# Patient Record
Sex: MALE | Race: WHITE | NOT HISPANIC OR LATINO | Employment: FULL TIME | URBAN - NONMETROPOLITAN AREA
[De-identification: names, ages, dates, MRNs, and addresses within clinical notes are randomized per-mention and may not be internally consistent; named-entity substitution may affect disease eponyms.]

---

## 2018-02-16 ENCOUNTER — TRANSCRIBE ORDERS (OUTPATIENT)
Dept: ADMINISTRATIVE | Facility: HOSPITAL | Age: 63
End: 2018-02-16

## 2018-02-16 DIAGNOSIS — G56.03 CARPAL TUNNEL SYNDROME, BILATERAL UPPER LIMBS: Primary | ICD-10-CM

## 2018-03-27 ENCOUNTER — HOSPITAL ENCOUNTER (OUTPATIENT)
Dept: NEUROLOGY | Facility: HOSPITAL | Age: 63
Discharge: HOME | End: 2018-03-27
Admitting: FAMILY MEDICINE

## 2018-03-27 DIAGNOSIS — G56.03 CARPAL TUNNEL SYNDROME, BILATERAL UPPER LIMBS: ICD-10-CM

## 2018-03-27 PROCEDURE — 95886 MUSC TEST DONE W/N TEST COMP: CPT

## 2018-03-27 PROCEDURE — 95911 NRV CNDJ TEST 9-10 STUDIES: CPT | Performed by: PSYCHIATRY & NEUROLOGY

## 2018-03-27 PROCEDURE — 95886 MUSC TEST DONE W/N TEST COMP: CPT | Performed by: PSYCHIATRY & NEUROLOGY

## 2018-03-27 PROCEDURE — 95911 NRV CNDJ TEST 9-10 STUDIES: CPT

## 2018-03-28 ENCOUNTER — APPOINTMENT (OUTPATIENT)
Dept: NEUROLOGY | Facility: HOSPITAL | Age: 63
End: 2018-03-28

## 2018-05-24 ENCOUNTER — HOSPITAL ENCOUNTER (OUTPATIENT)
Dept: GENERAL RADIOLOGY | Facility: HOSPITAL | Age: 63
Discharge: HOME | End: 2018-05-24
Admitting: NURSE PRACTITIONER

## 2018-05-24 ENCOUNTER — OFFICE VISIT (OUTPATIENT)
Dept: NEUROSURGERY | Facility: CLINIC | Age: 63
End: 2018-05-24

## 2018-05-24 VITALS
WEIGHT: 208 LBS | BODY MASS INDEX: 32.65 KG/M2 | DIASTOLIC BLOOD PRESSURE: 74 MMHG | HEIGHT: 67 IN | SYSTOLIC BLOOD PRESSURE: 122 MMHG

## 2018-05-24 DIAGNOSIS — Z78.9 NONSMOKER: ICD-10-CM

## 2018-05-24 DIAGNOSIS — M54.2 NECK PAIN: ICD-10-CM

## 2018-05-24 DIAGNOSIS — R20.0 NUMBNESS IN BOTH HANDS: ICD-10-CM

## 2018-05-24 DIAGNOSIS — G56.03 BILATERAL CARPAL TUNNEL SYNDROME: ICD-10-CM

## 2018-05-24 DIAGNOSIS — M54.2 NECK PAIN: Primary | ICD-10-CM

## 2018-05-24 DIAGNOSIS — R29.898 HAND WEAKNESS: ICD-10-CM

## 2018-05-24 PROCEDURE — 72040 X-RAY EXAM NECK SPINE 2-3 VW: CPT

## 2018-05-24 PROCEDURE — 99204 OFFICE O/P NEW MOD 45 MIN: CPT | Performed by: NURSE PRACTITIONER

## 2018-05-24 RX ORDER — MULTIPLE VITAMINS W/ MINERALS TAB 9MG-400MCG
1 TAB ORAL DAILY
COMMUNITY

## 2018-05-24 NOTE — PROGRESS NOTES
"Neurosurgery Initial Patient Visit    Patient: Adam Ferris  : 1955    Primary Care Provider: DINA Luna    Requesting Provider: Praveen Mojica MD      History    Chief Complaint:   Chief Complaint   Patient presents with   • Neck Pain     3 months ago he started having neck pain and numbness in both hands. Pt states that he has had issues with his neck for years.  Pt states that he hasn't had any PT, PM or chicropractor.   • Numbness       History of Present Illness: He is a 62-year-old  male who presents with chief complaint of \"numbness in hands, neck pain.\"  He is referred by Dr. Praveen Mojica through the [facility], and we have been asked see the patient in consultation for further evaluation.    He explains that he has had problems with his neck almost all of his life.  He does not relate any particular injuries or events.  The last 3 months, he has developed the worst neck pain that he has ever had and describes that it is now constant.  He has also had a completely new development numbness, tingling, and pain in both hands.  Asked, he has never experienced any type of radicular feature into either arm.  He had been waking up with both of his hands completely numb.  He underwent testing and was prescribed wrist splints about 2 months ago.  His numbness may be a bit less severe, but he never regains full sensation into either hand.  He has had cervical x-ray along with MRI and EMG nerve conduction testing of both upper extremities for review.  He has not yet had chiropractic treatment or therapy.  He has never been in a pain management setting.    Allergies:   Patient has no known allergies.    Past Medical History:    Past Medical History:   Diagnosis Date   • Diabetes mellitus    • Disease of thyroid gland    • Hyperlipidemia        Past Surgical History:   Past Surgical History:   Procedure Laterality Date   • MOLE REMOVAL     • VASECTOMY         Medications:    Current Outpatient " Prescriptions on File Prior to Visit   Medication Sig Dispense Refill   • aspirin  MG tablet Take 325 mg by mouth Daily.     • glipiZIDE (GLUCOTROL) 5 MG tablet Take 5 mg by mouth 2 (Two) Times a Day Before Meals.     • insulin aspart prot-insulin aspart (novoLOG 70/30) (70-30) 100 UNIT/ML injection Inject  under the skin 2 (Two) Times a Day With Meals. 60 units     • LEVOTHYROXINE SODIUM PO Take  by mouth Daily.     • lisinopril (PRINIVIL,ZESTRIL) 10 MG tablet Take 10 mg by mouth Daily.     • metFORMIN (GLUCOPHAGE) 1000 MG tablet Take 1,000 mg by mouth 2 (Two) Times a Day With Meals.     • pravastatin (PRAVACHOL) 10 MG tablet Take 10 mg by mouth Daily.     • [DISCONTINUED] erythromycin (ROMYCIN) 5 MG/GM ophthalmic ointment Administer  to the right eye Every 4 (Four) Hours While Awake. 3.5 g 0     No current facility-administered medications on file prior to visit.         Social History:   reports that he has never smoked. He has never used smokeless tobacco. He reports that he drinks alcohol. He reports that he does not use drugs.  He is  and lives here in [place].  He is employed as an  and .  With regards to alcohol, he consumes this rarely.    Family History:    Family History   Problem Relation Age of Onset   • Diabetes Mother    • Hypertension Mother    • Diabetes Father    • Diabetes Sister    • Diabetes Brother    • Hypertension Brother    • Diabetes Brother    • Hypertension Brother    • Diabetes Brother    • Hypertension Brother    • Diabetes Sister        Review of Systems:  Review of Systems   Constitutional: Negative for chills, fever and unexpected weight change.   HENT: Positive for hearing loss and tinnitus. Negative for congestion, rhinorrhea and sore throat.    Eyes: Negative for photophobia and visual disturbance.   Respiratory: Positive for apnea. Negative for cough, shortness of breath and wheezing.    Cardiovascular: Negative for chest pain  and palpitations.   Gastrointestinal: Negative for constipation, diarrhea, nausea and vomiting.   Genitourinary: Positive for frequency. Negative for difficulty urinating.   Musculoskeletal: Positive for neck pain and neck stiffness. Negative for arthralgias, back pain and gait problem.   Skin: Negative for rash.   Allergic/Immunologic: Negative for environmental allergies.   Neurological: Positive for weakness and numbness. Negative for seizures and headaches.   Hematological: Does not bruise/bleed easily.   Psychiatric/Behavioral: Negative for confusion. The patient is not nervous/anxious.    All other systems reviewed and are negative.        Neurological Physical Examination    Physical Exam   Constitutional: He is oriented to person, place, and time. He appears well-developed and well-nourished. No distress.   He is very pleasant and cooperative, in no acute distress.   HENT:   Head: Normocephalic and atraumatic.   Eyes: No scleral icterus.   Neck: Neck supple. No tracheal deviation and normal range of motion present.   Cardiovascular: Normal rate, regular rhythm and normal heart sounds.    No murmur heard.  Pulmonary/Chest: Effort normal and breath sounds normal. No respiratory distress. He has no wheezes.   Respirations even and unlabored with no distress.   Musculoskeletal:   Cervical range of motion is done pretty well.  It elicits a sensation of crepitus.  There is no acute focal weakness to either upper extremity, except a questionable subtle weakness of right .  Thumb abduction is done well bilaterally.  There is slight sponginess of the right thenar muscle and no definite Tinel's at either wrist or elbow.  Mildly positive Phalen's bilaterally.  Deep tendon reflexes diminished bilaterally.  Amy's negative.   Neurological: He is alert and oriented to person, place, and time. He displays normal reflexes. No cranial nerve deficit.   Optic discs not visualized.   Skin: Skin is warm and dry. No rash  noted.   Psychiatric: He has a normal mood and affect. His speech is normal and behavior is normal. Cognition and memory are normal.   Vitals reviewed.         Medical Decision Making    Management Options:  In the office we have discussed his care.  I have reviewed his imaging on disc.  The cervical x-ray shows significant degenerative change and arthritic spurring throughout.  There is resulting narrowing at multiple levels and anterolisthesis at C4 5.  The cervical MRI also indicates multi level degenerative changes.  These are most noted at C3 4, where there is disc degeneration and bulging with resulting foraminal narrowing that is greater on the left.  There is also a central disc bulge at C5 6, and disc degeneration and bulging at C6 7 with relative foraminal narrowing that is also greater on the left.  Review of EMG and nerve conduction testing indicates chronic nerve root irritation at C5 through 7 as well as bilateral carpal tunnel, likely a bit worse on the right.    We have talked about all of his test results at length, as well as continued plan of care.  He is experiencing exacerbation of chronic neck pain over the last 3 months, but he does not have true, definite radicular features affecting either upper extremity.  The only symptoms that he reports are clearly confined to the hands and fingers and would seem to be highly consistent with known bilateral carpal tunnel.  A true cervical radiculopathy seems less likely at this time.    Have agreed on a current course of physical therapy over the next 4 weeks.  He will continue with use of his wrist splints.  We have discussed medications and he really would like to avoid them if at all possible.  He is more comfortable taking something over-the-counter, likely Tylenol on occasion.  We will further evaluate the anterolisthesis in the cervical spine with an x-ray with flexion and extension views.  We have also talked about the possibility of surgical  treatment for carpal tunnel.  We have agreed on seeing him back in 4-6 weeks with Dr. Guy to determine his response to therapy.  If he is not improving or feels that he is worsening, that he may be ready for a surgical discussion, which again, I think would be to address a right carpal tunnel release.    Imaging is here in the office on file for further review as needed.    Information regarding BMI has been given in an effort to help support overall health and wellness.    Adam was seen today for neck pain and numbness.    Diagnoses and all orders for this visit:    Neck pain  -     Ambulatory Referral to Physical Therapy Evaluate and treat (3x a week for 4 weeks), Neuro, Ortho  -     XR Spine Cervical Flex & Ext Only; Future    Numbness in both hands    Bilateral carpal tunnel syndrome    Hand weakness    BMI 32.0-32.9,adult    Nonsmoker        Thank you, Praveen Mojica MD for this Consultation and the opportunity to participate in the care of this pleasant patient.

## 2018-05-24 NOTE — PATIENT INSTRUCTIONS

## 2018-06-15 ENCOUNTER — HOSPITAL ENCOUNTER (OUTPATIENT)
Dept: PHYSICAL THERAPY | Facility: HOSPITAL | Age: 63
Setting detail: THERAPIES SERIES
Discharge: HOME | End: 2018-06-15

## 2018-06-15 DIAGNOSIS — R20.0 NUMBNESS IN BOTH HANDS: ICD-10-CM

## 2018-06-15 DIAGNOSIS — M54.2 NECK PAIN: Primary | ICD-10-CM

## 2018-06-15 DIAGNOSIS — G56.03 BILATERAL CARPAL TUNNEL SYNDROME: ICD-10-CM

## 2018-06-15 PROCEDURE — 97161 PT EVAL LOW COMPLEX 20 MIN: CPT | Performed by: PHYSICAL THERAPIST

## 2018-06-15 NOTE — THERAPY EVALUATION
Outpatient Physical Therapy Ortho Initial Evaluation  [facility]     Patient Name: Adam Ferris  : 1955  MRN: [id number]  Today's Date: 6/15/2018      Visit Date: 06/15/2018    Patient Active Problem List   Diagnosis   • Neck pain   • Numbness in both hands   • BMI 32.0-32.9,adult   • Nonsmoker   • Hand weakness   • Bilateral carpal tunnel syndrome        Past Medical History:   Diagnosis Date   • Diabetes mellitus    • Disease of thyroid gland    • Hyperlipidemia         Past Surgical History:   Procedure Laterality Date   • MOLE REMOVAL     • VASECTOMY         Visit Dx:     ICD-10-CM ICD-9-CM   1. Neck pain M54.2 723.1   2. Numbness in both hands R20.0 782.0   3. Bilateral carpal tunnel syndrome G56.03 354.0             Patient History     Row Name 06/15/18 0745             History    Chief Complaint Pain;Joint stiffness;Numbness  -TB      Type of Pain Neck pain  -TB      Date Current Problem(s) Began 02/15/18  -TB      Brief Description of Current Complaint He has a lifelong h/o neck pain. Over the last 4 months ago, he started having numbness in his hands which affects his work. He says his neck pain has also worsened during this time. He says he was told he may need surgery but they want to try other options first.  -TB      Patient/Caregiver Goals Relieve pain;Return to prior level of function;Improve mobility  -TB      Patient's Rating of General Health Good  -TB      Hand Dominance right-handed  -TB      Occupation/sports/leisure activities   -TB      What clinical tests have you had for this problem? X-ray  -TB      Results of Clinical Tests severe spondylosis in neck  -TB         Pain     Pain Location Neck  -TB      Pain at Present 2  -TB      Pain at Best 1  -TB      Pain at Worst 3  -TB      Pain Frequency Constant/continuous  -TB      Pain Description Aching  -TB      Pain Comments c/o hand burning every night; then burning and numbness is his whole hand like a glove  -TB       Difficulties at work? bothers him when he types  -TB         Fall Risk Assessment    Any falls in the past year: No  -TB         Services    Prior Rehab/Home Health Experiences No  -TB      Are you currently receiving Home Health services No  -TB         Daily Activities    Primary Language English  -TB      How does patient learn best? Listening;Reading;Demonstration  -TB      Teaching needs identified Home Exercise Program;Management of Condition  -TB      Patient is concerned about/has problems with Performing home management (household chores, shopping, care of dependents);Performing job responsibilities/community activities (work, school,;Difficulty with self care (i.e. bathing, dressing, toileting:;Grasping objects lifting  -TB      Does patient have problems with the following? None  -TB      Barriers to learning None  -TB      Pt Participated in POC and Goals Yes  -TB         Safety    Are you being hurt, hit, or frightened by anyone at home or in your life? No  -TB      Are you being neglected by a caregiver No  -TB        User Key  (r) = Recorded By, (t) = Taken By, (c) = Cosigned By    Initials Name Provider Type    TB Slick Wick, PT Physical Therapist                PT Ortho     Row Name 06/15/18 0800       Pathomechanics    Spine Pathomechanics Hinges into extension in lower cervical;Limited upper thoracic motion with cervical ROM;Limited opposite AA rotation with cervical sidebending;Cervical protrusion/OA hyperextension with cervical extension  -TB    Row Name 06/15/18 0745       Posture/Observations    Posture/Observations Comments forward   -TB       Quarter Clearing    Quarter Clearing Upper Quarter Clearing  -TB       DTR- Upper Quarter Clearing    Biceps (C5/6) Bilateral:;2- Normal response  -TB    Brachioradialis (C6) Bilateral:;2- Normal response  -TB    Triceps (C7) Bilateral:;2- Normal response  -TB       Sensory Screen for Light Touch- Upper Quarter Clearing    C4 (posterior shoulder)  Bilateral:;Intact  -TB    C5 (lateral upper arm) Bilateral:;Intact  -TB    C6 (tip of thumb) Bilateral:;Intact  -TB    C7 (tip of 3rd finger) Bilateral:;Intact  -TB    C8 (tip of 5th finger) Bilateral:;Intact  -TB    T1 (medial lower arm) Bilateral:;Intact  -TB       Myotomal Screen- Upper Quarter Clearing    Shoulder flexion (C5) Bilateral:;WNL  -TB    Elbow flexion/wrist extension (C6) Bilateral:;WNL  -TB    Elbow extension/wrist flexion (C7) Bilateral:;WNL  -TB    Finger flexion/ (C8) Bilateral:;WNL  -TB    Finger abduction (T1) Bilateral:;WNL  -TB     Bilateral:;WNL  -TB       Cervical/Shoulder ROM Screen    Cervical flexion Normal  -TB    Cervical extension Impaired   25%  -TB    Cervical lateral flexion Impaired   50%  -TB    Cervical rotation Impaired   75%  -TB       Special Tests/Palpation    Special Tests/Palpation Cervical/Thoracic  -TB       Cervical Accessory Motions    Cervical Accessory Motions Tested? Yes  -TB    AA Rotation Hypomobile  -TB    Sideglide- C3 Right:;Left:;Hypomobile   left worse  -TB    Sideglide- C4 Right:;Left:;Hypomobile   left worse  -TB    Sideglide- C5 Right:;Left:;Hypomobile   left worse  -TB    PA glide- C6 Center:;Hypomobile  -TB    PA glide- C7 Center:;Hypomobile  -TB       Thoracic Accessory Motions    Thoracic Accessory Motions Tested? Yes  -TB    Pa glide- Upper thoracic Center:;Hypomobile  -TB    Pa glide- Middle thoracic Center:;Hypomobile  -TB       Cervical/Thoracic Special Tests    Spurlings (Foraminal Compression) Bilateral:;Negative  -TB    Cervical Compression (Forarminal Compression vs. Facet Pain) Bilateral:;Negative  -TB    Cervical Distraction (Foraminal Compression vs. Facet Pain) Bilateral:;Negative  -TB    Lauren's Test (TOS) Bilateral:;Positive  -TB       Wrist/Hand Special Tests    Phalen’s test (carpal tunnel syndrome) Bilateral:;Positive   slight change in sensation in finger  -TB    Reverse Phalen’s (carpal tunnel syndrome) Bilateral:;Negative  -TB     Tinel’s sign (median nerve irritation) Bilateral:;Negative  -TB    Scratch collapse at Guyon’s canal (ulnar nerve entrapment) Bilateral:;Positive   at neck  -TB      User Key  (r) = Recorded By, (t) = Taken By, (c) = Cosigned By    Initials Name Provider Type    MARGUERITE Wick, PT Physical Therapist           Hand Therapy (last 24 hours)      Hand Eval     Row Name 06/15/18 0800             Hand  Strength     Strength Affected Side Bilateral  -TB          Strength Right    # Reps 3  -TB      Right Rung 3  -TB      Right  Test 1 94  -TB      Right  Test 2 106  -TB      Right  Test 3 105  -TB       Strength Average Right 101.67  -TB          Strength Left    # Reps 3  -TB      Left Rung 3  -TB      Left  Test 1 110  -TB      Left  Test 2 114  -TB      Left  Test 3 111  -TB       Strength Average Left 111.67  -TB        User Key  (r) = Recorded By, (t) = Taken By, (c) = Cosigned By    Initials Name Provider Type    MARGUERITE Wick, PT Physical Therapist                    Therapy Education  Education Details: wrist flexors stretch, doorway pect stretch, cx retraction/ext  Given: HEP, Posture/body mechanics, Symptoms/condition management  Program: New  How Provided: Verbal, Demonstration, Written  Provided to: Patient  Level of Understanding: Teach back education performed, Verbalized, Demonstrated           PT OP Goals     Row Name 06/15/18 0745          Long Term Goals    LTG Date to Achieve 07/27/18  -TB     LTG 1 No numbness in his hands for a week  -TB     LTG 1 Progress New  -TB     LTG 2 Improve neck extension to 50%  -TB     LTG 2 Progress New  -TB     LTG 3 Improve neck pain to <25% of the day avg of 1-2/10 when it occurs  -TB     LTG 3 Progress New  -TB     LTG 4 Able to type and work without exacerbation of hand numbness  -TB     LTG 4 Progress New  -TB     LTG 5 Ind with HEP for flexibility and ergonomics.   -TB        Time Calculation    PT Goal  Re-Cert Due Date 07/15/18  -TB       User Key  (r) = Recorded By, (t) = Taken By, (c) = Cosigned By    Initials Name Provider Type    TB Slick Wick, PT Physical Therapist                PT Assessment/Plan     Row Name 06/15/18 0745          PT Assessment    Functional Limitations Limitation in home management;Performance in work activities;Limitations in functional capacity and performance;Performance in leisure activities;Performance in self-care ADL  -TB     Impairments Posture;Range of motion;Pain;Sensation;Poor body mechanics  -TB     Assessment Comments He appears to have a couple of issues going on. What reproduced his hand numbness was a carpal tunnel test, but he was also positive with a Scratch-collapse test at his neck. His neck is quite stiff with a forward head posture. He hinges into extension in his lower neck. He said that his whole hand like a glove was going numb which would mean perhaps something other than his CTS was contributing to the numbness. I think he can do well with PT to help to decrease his neck pain and have a strategy to help his hand symptoms.   -TB     Please refer to paper survey for additional self-reported information Yes  -TB     Rehab Potential Good  -TB     Patient/caregiver participated in establishment of treatment plan and goals Yes  -TB     Patient would benefit from skilled therapy intervention Yes  -TB        PT Plan    PT Frequency 2x/week  -TB     Predicted Duration of Therapy Intervention (Therapy Eval) 6 weeks  -TB     Planned CPT's? PT EVAL LOW COMPLEXITY: 35486;PT THER PROC EA 15 MIN: 81337;PT MANUAL THERAPY EA 15 MIN: 97025;PT ELECTRICAL STIM UNATTEND: ;PT ELECTRICAL STIM ATTD EA 15 MIN: 59630;PT TRACTION CERVICAL: 13566  -TB     PT Plan Comments We will focus early on mobilizations through his neck and thoracic as well as his carpals to improve nerve mobility and decompress wherever we can. We will also work on flexibility through his pect and progress  with postural stability.   -TB       User Key  (r) = Recorded By, (t) = Taken By, (c) = Cosigned By    Initials Name Provider Type    TB Slick Wick, PT Physical Therapist                                        Time Calculation:     Therapy Suggested Charges     Code   Minutes Charges    None             Start Time: 0745  Stop Time: 0850  Time Calculation (min): 65 min     Therapy Charges for Today     Code Description Service Date Service Provider Modifiers Qty    [id number]  PT EVAL LOW COMPLEXITY 4 6/15/2018 Slick Wick, PT GP 1                    Slick Wick, PT  6/15/2018

## 2018-06-18 ENCOUNTER — OFFICE VISIT (OUTPATIENT)
Dept: GASTROENTEROLOGY | Facility: CLINIC | Age: 63
End: 2018-06-18

## 2018-06-18 VITALS
OXYGEN SATURATION: 100 % | SYSTOLIC BLOOD PRESSURE: 130 MMHG | DIASTOLIC BLOOD PRESSURE: 76 MMHG | BODY MASS INDEX: 32.65 KG/M2 | HEIGHT: 67 IN | TEMPERATURE: 98.7 F | HEART RATE: 80 BPM | WEIGHT: 208 LBS

## 2018-06-18 DIAGNOSIS — Z12.11 ENCOUNTER FOR SCREENING FOR MALIGNANT NEOPLASM OF COLON: Primary | ICD-10-CM

## 2018-06-18 PROCEDURE — S0260 H&P FOR SURGERY: HCPCS | Performed by: NURSE PRACTITIONER

## 2018-06-18 NOTE — PROGRESS NOTES
Chief Complaint   Patient presents with   • Colonoscopy     had colon about 10 years ago he is not having any problems now not sure who did last colon     Subjective   HPI    Adam Ferris is a 62 y.o. male who presents to office for preventative maintenance.  There is not  a personal history of colon polyps.  There is not a history of colon cancer.  He does not have complaints of nausea/vomiting, change in bowels, weight loss, no BRBPR, no melena.  There is not a family history of colon cancer.  There is not a family history of colon polyps.  Pt last colonoscopy-apx 10 yr ago (unsure of physician) .  Bowels do not move on regular basis.      Past Medical History:   Diagnosis Date   • Diabetes mellitus    • Disease of thyroid gland    • Hyperlipidemia      Past Surgical History:   Procedure Laterality Date   • MOLE REMOVAL     • VASECTOMY       Outpatient Prescriptions Marked as Taking for the 6/18/18 encounter (Office Visit) with DINA Johnson   Medication Sig Dispense Refill   • ACAI LUCAS PO Take  by mouth.     • Ascorbic Acid (VITAMIN C PO) Take 1,000 mg by mouth Daily.     • aspirin  MG tablet Take 325 mg by mouth Daily.     • Cholecalciferol (VITAMIN D3 PO) Take  by mouth.     • glipiZIDE (GLUCOTROL) 5 MG tablet Take 5 mg by mouth 2 (Two) Times a Day Before Meals.     • insulin aspart prot-insulin aspart (novoLOG 70/30) (70-30) 100 UNIT/ML injection Inject  under the skin 2 (Two) Times a Day With Meals. 60 units     • LEVOTHYROXINE SODIUM PO Take  by mouth Daily.     • lisinopril (PRINIVIL,ZESTRIL) 10 MG tablet Take 10 mg by mouth Daily.     • metFORMIN (GLUCOPHAGE) 1000 MG tablet Take 1,000 mg by mouth 2 (Two) Times a Day With Meals.     • Multiple Vitamins-Minerals (MULTIVITAMIN WITH MINERALS) tablet tablet Take 1 tablet by mouth Daily.     • Omega-3 Fatty Acids (FISH OIL) 1200 MG capsule delayed-release Take  by mouth.     • pravastatin (PRAVACHOL) 10 MG tablet Take 10 mg by mouth Daily.        No Known Allergies  Social History     Social History   • Marital status:      Spouse name: N/A   • Number of children: N/A   • Years of education: N/A     Occupational History   • Not on file.     Social History Main Topics   • Smoking status: Never Smoker   • Smokeless tobacco: Former User   • Alcohol use Yes      Comment: occ   • Drug use: No   • Sexual activity: Defer     Other Topics Concern   • Not on file     Social History Narrative   • No narrative on file     Family History   Problem Relation Age of Onset   • Diabetes Mother    • Hypertension Mother    • Diabetes Father    • Colon polyps Father    • Diabetes Sister    • Diabetes Brother    • Hypertension Brother    • Colon polyps Brother    • Diabetes Brother    • Hypertension Brother    • Diabetes Brother    • Hypertension Brother    • Diabetes Sister    • Throat cancer Maternal Grandfather      Review of Systems   Constitutional: Negative for fatigue, fever and unexpected weight change.   HENT: Negative for hearing loss, sore throat and voice change.    Eyes: Negative for visual disturbance.   Respiratory: Negative for cough, shortness of breath and wheezing.    Cardiovascular: Negative for chest pain and palpitations.   Gastrointestinal: Negative for abdominal pain, blood in stool and vomiting.   Endocrine: Negative for polydipsia and polyuria.   Genitourinary: Negative for difficulty urinating, dysuria, hematuria and urgency.   Musculoskeletal: Negative for joint swelling and myalgias.   Skin: Negative for color change, rash and wound.   Neurological: Negative for dizziness, tremors, seizures and syncope.   Hematological: Does not bruise/bleed easily.   Psychiatric/Behavioral: Negative for agitation and confusion. The patient is not nervous/anxious.      Objective   Vitals:    06/18/18 1400   BP: 130/76   Pulse: 80   Temp: 98.7 °F (37.1 °C)   SpO2: 100%     Physical Exam   Constitutional: He is oriented to person, place, and time. He appears  well-developed and well-nourished. He is cooperative.   HENT:   Head: Normocephalic and atraumatic.   Eyes: Conjunctivae are normal. Pupils are equal, round, and reactive to light. No scleral icterus.   Neck: Normal range of motion. Neck supple. No JVD present. No thyroid mass and no thyromegaly present.   Cardiovascular: Normal rate, regular rhythm and normal heart sounds.  Exam reveals no gallop and no friction rub.    No murmur heard.  Pulmonary/Chest: Effort normal and breath sounds normal. No accessory muscle usage. No respiratory distress. He has no wheezes. He has no rales.   Abdominal: Soft. Normal appearance and bowel sounds are normal. He exhibits no distension, no ascites and no mass. There is no hepatosplenomegaly. There is no tenderness. There is no rebound and no guarding.   Musculoskeletal: Normal range of motion. He exhibits no edema or tenderness.     Vascular Status -  His right foot exhibits normal foot vasculature  and no edema. His left foot exhibits normal foot vasculature  and no edema.  Lymphadenopathy:     He has no cervical adenopathy.   Neurological: He is alert and oriented to person, place, and time. He has normal strength. Gait normal.   Skin: Skin is warm, dry and intact. No rash noted.     Imaging Results (most recent)     None        Body mass index is 32.58 kg/m².    Assessment/Plan   Adam was seen today for colonoscopy.    Diagnoses and all orders for this visit:    Encounter for screening for malignant neoplasm of colon  -     polyethylene glycol (GoLYTELY) 236 g solution; Take 3,785 mL by mouth 1 (One) Time for 1 dose. Take as directed  -     Case Request; Standing  -     Implement Anesthesia Orders Day of Procedure; Standing  -     Obtain Informed Consent; Standing  -     Case Request      COLONOSCOPY WITH ANESTHESIA (N/A)    Advised pt to stop ASA, use of NSAIDs, Fish Oil, and MV 5 days prior to procedure, per Dr Sanz protocol.  Tylenol based products are ok to take.  Pt  verbalized understanding.    Patient is to continue all blood pressure and cardiac medications prior to procedure and has been advised to take medications morning of procedure   Pt verbalized understanding    All risks, benefits, alternatives, and indications of colonoscopy procedure have been discussed with the patient. Risks to include perforation of the colon requiring possible surgery or colostomy, risk of bleeding from biopsies or removal of colon tissue, possibility of missing a colon polyp or cancer, or adverse drug reaction.  Benefits to include the diagnosis and management of disease of the colon and rectum. Alternatives to include barium enema, radiographic evaluation, lab testing or no intervention. Pt verbalizes understanding and agrees.     Patient's Body mass index is 32.58 kg/m². BMI is above normal parameters. Recommendations include: no follow-up required.      There are no Patient Instructions on file for this visit.

## 2018-06-19 PROBLEM — Z12.11 ENCOUNTER FOR SCREENING FOR MALIGNANT NEOPLASM OF COLON: Status: ACTIVE | Noted: 2018-06-19

## 2018-06-20 ENCOUNTER — HOSPITAL ENCOUNTER (OUTPATIENT)
Dept: PHYSICAL THERAPY | Facility: HOSPITAL | Age: 63
Setting detail: THERAPIES SERIES
Discharge: HOME | End: 2018-06-20

## 2018-06-20 DIAGNOSIS — M54.2 NECK PAIN: Primary | ICD-10-CM

## 2018-06-20 DIAGNOSIS — G56.03 BILATERAL CARPAL TUNNEL SYNDROME: ICD-10-CM

## 2018-06-20 DIAGNOSIS — R20.0 NUMBNESS IN BOTH HANDS: ICD-10-CM

## 2018-06-20 PROCEDURE — 97140 MANUAL THERAPY 1/> REGIONS: CPT

## 2018-06-20 PROCEDURE — 97110 THERAPEUTIC EXERCISES: CPT

## 2018-06-20 NOTE — THERAPY TREATMENT NOTE
Outpatient Physical Therapy Ortho Treatment Note  [facility]     Patient Name: Adam Ferris  : 1955  MRN: [id number]  Today's Date: 2018      Visit Date: 2018    Visit Dx:    ICD-10-CM ICD-9-CM   1. Neck pain M54.2 723.1   2. Numbness in both hands R20.0 782.0   3. Bilateral carpal tunnel syndrome G56.03 354.0       Patient Active Problem List   Diagnosis   • Neck pain   • Numbness in both hands   • BMI 32.0-32.9,adult   • Nonsmoker   • Hand weakness   • Bilateral carpal tunnel syndrome   • Encounter for screening for malignant neoplasm of colon        Past Medical History:   Diagnosis Date   • Diabetes mellitus    • Disease of thyroid gland    • Hyperlipidemia         Past Surgical History:   Procedure Laterality Date   • MOLE REMOVAL     • VASECTOMY                               PT Assessment/Plan     Row Name 18 0846          PT Assessment    Assessment Comments Today was pt's first visit since [facility] therefore no goals have been achieved at this time. Pt reports today that he has not had any pain the last couple of days; however his numbness in B hands remains. Pt has been complaint with HEP but required cueing today to perform chin tucks correctly and with good form. Todays treatment focused on improving mobiltiy throughout thoracic and cervical spine as well as decreasing soft tissue restrictions limiting motion. Pt very stiff throughout thoracic and cervical spine today. Used towel roll stretch in hooklying position to stretch B pecs and promote improved posture. Pt had no change in symptoms with passive median nerve glides today.   -TR        PT Plan    PT Plan Comments We will continue to focus on improving mobility throughout thoracic and cervical spine and also work towards decompression and improving nerve mobility for B carpals.   -TR       User Key  (r) = Recorded By, (t) = Taken By, (c) = Cosigned By    Initials Name Provider Type    BALDOMERO Louie PTA  "Physical Therapy Assistant                    Exercises     Row Name 06/20/18 0846             Subjective Comments    Subjective Comments Pt reports that his hand feel \"funny\" but no pain in his hands or his neck. Pt reports slight numbness in hands pre treatment that slighty increased post treatment.   -TR         Subjective Pain    Able to rate subjective pain? yes  -TR      Pre-Treatment Pain Level 0  -TR      Post-Treatment Pain Level 0  -TR         Total Minutes    50039 - PT Therapeutic Exercise Minutes 10  -TR      25093 - PT Manual Therapy Minutes 35  -TR         Exercise 1    Exercise Name 1 B passive median nerve glides   -TR      Cueing 1 Verbal  -TR      Sets 1 1  -TR      Reps 1 10  -TR      Additional Comments slight increase in numbness in hands post exercise  -TR         Exercise 2    Exercise Name 2 Thoracic towel roll stretch   -TR      Cueing 2 Verbal  -TR      Time 2 3 min   -TR        User Key  (r) = Recorded By, (t) = Taken By, (c) = Cosigned By    Initials Name Provider Type    TR Parris Louie, PTA Physical Therapy Assistant                        Manual Rx (last 36 hours)      Manual Treatments     Row Name 06/20/18 0846             Total Minutes    97646 - PT Manual Therapy Minutes 35  -TR         Manual Rx 1    Manual Rx 1 Location Thoracic paraspinals  -TR      Manual Rx 1 Type STM in prone with blue foam roller  -TR      Manual Rx 1 Grade min  -TR      Manual Rx 1 Duration 7  -TR         Manual Rx 2    Manual Rx 2 Location Mid-Upper thoracic  -TR      Manual Rx 2 Type Extension mobilizations in prone  -TR      Manual Rx 2 Grade 1-2  -TR      Manual Rx 2 Duration 8  -TR         Manual Rx 3    Manual Rx 3 Location B UT, LS, and cervical parspinals   -TR      Manual Rx 3 Type Manual STM in hooklying and B UT stretches   -TR      Manual Rx 3 Grade min  -TR      Manual Rx 3 Duration 10  -TR         Manual Rx 4    Manual Rx 4 Location Cervical  -TR      Manual Rx 4 Type subocciptal " release and lower cervical traction   -TR      Manual Rx 4 Grade 1-2  -TR      Manual Rx 4 Duration 10  -TR        User Key  (r) = Recorded By, (t) = Taken By, (c) = Cosigned By    Initials Name Provider Type    BALDOMERO Louie PTA Physical Therapy Assistant                PT OP Goals     Row Name 06/20/18 0846          Long Term Goals    LTG Date to Achieve 07/27/18  -TR     LTG 1 No numbness in his hands for a week  -TR     LTG 1 Progress Ongoing  -TR     LTG 1 Progress Comments slight numbness reported today   -TR     LTG 2 Improve neck extension to 50%  -TR     LTG 2 Progress Ongoing  -TR     LTG 3 Improve neck pain to <25% of the day avg of 1-2/10 when it occurs  -TR     LTG 3 Progress Ongoing  -TR     LTG 3 Progress Comments no pain today or over the weekend   -TR     LTG 4 Able to type and work without exacerbation of hand numbness  -TR     LTG 4 Progress Ongoing  -TR     LTG 5 Ind with HEP for flexibility and ergonomics.   -TR     LTG 5 Progress Ongoing  -TR        Time Calculation    PT Goal Re-Cert Due Date 07/15/18  -TR       User Key  (r) = Recorded By, (t) = Taken By, (c) = Cosigned By    Initials Name Provider Type    BALDOMERO Louie PTA Physical Therapy Assistant          Therapy Education  Education Details: Reviewed all components of HEP   Given: HEP  Program: Reinforced  How Provided: Verbal, Demonstration  Provided to: Patient  Level of Understanding: Verbalized, Demonstrated              Time Calculation:   Start Time: 0846  Stop Time: 0931  Time Calculation (min): 45 min  Total Timed Code Minutes- PT: 45 minute(s)  Therapy Suggested Charges     Code   Minutes Charges    02023 (CPT®) Hc Pt Neuromusc Re Education Ea 15 Min      05305 (CPT®) Hc Pt Ther Proc Ea 15 Min 10 1    76088 (CPT®) Hc Gait Training Ea 15 Min      39832 (CPT®) Hc Pt Therapeutic Act Ea 15 Min      09256 (CPT®) Hc Pt Manual Therapy Ea 15 Min 35 2    54749 (CPT®) Hc Pt Ther Massage- Per 15 Min      78809 (CPT®) Hc  Pt Iontophoresis Ea 15 Min      34281 (CPT®) Hc Pt Elec Stim Ea-Per 15 Min      36551 (CPT®) Hc Pt Ultrasound Ea 15 Min      87468 (CPT®) Hc Pt Self Care/Mgmt/Train Ea 15 Min      Total  45 3        Therapy Charges for Today     Code Description Service Date Service Provider Modifiers Qty    38340307944 HC PT THER PROC EA 15 MIN 6/20/2018 Parris Louie PTA GP 1    53417325374 HC PT MANUAL THERAPY EA 15 MIN 6/20/2018 Parris Louie PTA GP 2                    Parris Louie PTA  6/20/2018

## 2018-06-25 ENCOUNTER — HOSPITAL ENCOUNTER (OUTPATIENT)
Dept: PHYSICAL THERAPY | Facility: HOSPITAL | Age: 63
Setting detail: THERAPIES SERIES
Discharge: HOME | End: 2018-06-25

## 2018-06-25 DIAGNOSIS — R20.0 NUMBNESS IN BOTH HANDS: ICD-10-CM

## 2018-06-25 DIAGNOSIS — M54.2 NECK PAIN: Primary | ICD-10-CM

## 2018-06-25 DIAGNOSIS — G56.03 BILATERAL CARPAL TUNNEL SYNDROME: ICD-10-CM

## 2018-06-25 PROCEDURE — 97140 MANUAL THERAPY 1/> REGIONS: CPT

## 2018-06-25 PROCEDURE — 97110 THERAPEUTIC EXERCISES: CPT

## 2018-06-25 NOTE — THERAPY TREATMENT NOTE
Outpatient Physical Therapy Ortho Treatment Note  [facility]     Patient Name: Adam Ferris  : 1955  MRN: [id number]  Today's Date: 2018      Visit Date: 2018    Visit Dx:    ICD-10-CM ICD-9-CM   1. Neck pain M54.2 723.1   2. Numbness in both hands R20.0 782.0   3. Bilateral carpal tunnel syndrome G56.03 354.0       Patient Active Problem List   Diagnosis   • Neck pain   • Numbness in both hands   • BMI 32.0-32.9,adult   • Nonsmoker   • Hand weakness   • Bilateral carpal tunnel syndrome   • Encounter for screening for malignant neoplasm of colon        Past Medical History:   Diagnosis Date   • Diabetes mellitus    • Disease of thyroid gland    • Hyperlipidemia         Past Surgical History:   Procedure Laterality Date   • MOLE REMOVAL     • VASECTOMY              Hand Therapy (last 24 hours)      Hand Eval     Row Name 18 0802             Subjective Comments    Subjective Comments Patient denies pain today, but he does report tingling in his bilateral hands.    -YAMILEX        User Key  (r) = Recorded By, (t) = Taken By, (c) = Cosigned By    Initials Name Provider Type    YAMILEX Borjas PTA Physical Therapy Assistant                          PT Assessment/Plan     Row Name 18 0800          PT Assessment    Assessment Comments Patient denied neck pain today and is feeling well overall since last visit. He states there is still some tingling in his hands. Today's treatment focused on manual techniques to promote greater extension and mobility for thoracic and cervical spine.  There was still increased muscle guarding present at bilateral upper trapezius, levator scapula, and thoracic paraspinals.  -YAMILEX     Rehab Potential --  -YAMILEX        PT Plan    PT Plan Comments We will continue to work on improving mobility of the cervicothoracic spine.  We will also continue to educate on proper posture.  -YAMILEX       User Key  (r) = Recorded By, (t) = Taken By, (c) = Cosigned By    Initials Name  Provider Type    YAMILEX Borjas PTA Physical Therapy Assistant                    Exercises     Row Name 06/25/18 0802             Subjective Comments    Subjective Comments Patient denies pain today, but he does report tingling in his bilateral hands.    -YAMILEX         Subjective Pain    Able to rate subjective pain? yes  -YAMILEX      Pre-Treatment Pain Level 0  -YAMILEX      Post-Treatment Pain Level 2   low back from supine position  -YAMILEX         Total Minutes    56441 - PT Therapeutic Exercise Minutes 10  -YAMILEX      90862 - PT Manual Therapy Minutes 28  -YAMILEX         Exercise 1    Exercise Name 1 bilateral median nerve glide  -YAMILEX      Cueing 1 Verbal  -YAMILEX      Sets 1 1  -YAMILEX      Reps 1 15  -YAMILEX      Additional Comments no change in symptoms during nerve glide and no pain produced  -YAMILEX         Exercise 2    Exercise Name 2 chin tucks  -YAMILEX      Cueing 2 Verbal  -YAMILEX      Sets 2 1  -YAMILEX      Reps 2 10  -YAMILEX      Additional Comments cues for submaximal contraction  -YAMILEX        User Key  (r) = Recorded By, (t) = Taken By, (c) = Cosigned By    Initials Name Provider Type    YAMILEX Borjas PTA Physical Therapy Assistant                        Manual Rx (last 36 hours)      Manual Treatments     Row Name 06/25/18 0802             Total Minutes    64016 - PT Manual Therapy Minutes 28  -YAMILEX         Manual Rx 1    Manual Rx 1 Location UT, LS, and thoracic paraspinals  -YAMILEX      Manual Rx 1 Type STM in prone   -YAMILEX      Manual Rx 1 Grade min-mod  -YAMILEX      Manual Rx 1 Duration 10 min  -YAMILEX         Manual Rx 2    Manual Rx 2 Location Mid-upper thoracic  -YAMILEX      Manual Rx 2 Type Extension mobilizations  -YAMILEX      Manual Rx 2 Grade 2  -YAMILEX      Manual Rx 2 Duration 5 min  -YAMILEX         Manual Rx 3    Manual Rx 3 Location prone Cervical-Thoracic junction   -YAMILEX      Manual Rx 3 Type extension mobilizations  -YAMILEX      Manual Rx 3 Grade 2  -YAMILEX      Manual Rx 3 Duration 5 min  -YAMILEX         Manual Rx 4    Manual Rx 4 Location Cervical  -YAMILEX      Manual  Rx 4 Type suboccipital release and lower cervical traction  -YAMILEX      Manual Rx 4 Grade 2  -YAMILEX      Manual Rx 4 Duration 8 min  -YAMILEX         Manual Rx 5    Manual Rx 5 Location --  -YAMILEX        User Key  (r) = Recorded By, (t) = Taken By, (c) = Cosigned By    Initials Name Provider Type    YAMILEX Borjas PTA Physical Therapy Assistant                PT OP Goals     Row Name 06/25/18 0802          Long Term Goals    LTG Date to Achieve 07/27/18  -YAMILEX     LTG 1 No numbness in his hands for a week  -YAMILEX     LTG 1 Progress Ongoing  -YAMILEX     LTG 1 Progress Comments some tingling in hands reported today, which did not change during treatment today  -YAMILEX     LTG 2 Improve neck extension to 50%  -YAMILEX     LTG 2 Progress Ongoing  -YAMILEX     LTG 3 Improve neck pain to <25% of the day avg of 1-2/10 when it occurs  -YAMILEX     LTG 3 Progress Ongoing  -YAMILEX     LTG 3 Progress Comments no neck pain reported today  -YAMILEX     LTG 4 Able to type and work without exacerbation of hand numbness  -YAMILEX     LTG 4 Progress Ongoing  -YAMILEX     LTG 5 Ind with HEP for flexibility and ergonomics.   -YAMILEX     LTG 5 Progress Ongoing  -YAMILEX        Time Calculation    PT Goal Re-Cert Due Date 07/15/18  -YAMILEX       User Key  (r) = Recorded By, (t) = Taken By, (c) = Cosigned By    Initials Name Provider Type    YAMILEX Borjas PTA Physical Therapy Assistant          Therapy Education  Given: HEP  Program: Reinforced  How Provided: Verbal  Provided to: Patient  Level of Understanding: Verbalized              Time Calculation:   Start Time: 0802  Stop Time: 0847  Time Calculation (min): 45 min  Total Timed Code Minutes- PT: 38 minute(s)  Therapy Suggested Charges     Code   Minutes Charges    24142 (CPT®) Hc Pt Neuromusc Re Education Ea 15 Min      42821 (CPT®) Hc Pt Ther Proc Ea 15 Min 10 1    76631 (CPT®) Hc Gait Training Ea 15 Min      84901 (CPT®) Hc Pt Therapeutic Act Ea 15 Min      85019 (CPT®) Hc Pt Manual Therapy Ea 15 Min 28 2    54590 (CPT®) Hc Pt Ther Massage-  Per 15 Min      71080 (CPT®) Hc Pt Iontophoresis Ea 15 Min      43719 (CPT®) Hc Pt Elec Stim Ea-Per 15 Min      73136 (CPT®) Hc Pt Ultrasound Ea 15 Min      66175 (CPT®) Hc Pt Self Care/Mgmt/Train Ea 15 Min      Total  38 3        Therapy Charges for Today     Code Description Service Date Service Provider Modifiers Qty    22115467169 HC PT THER PROC EA 15 MIN 6/25/2018 Ashkan Borjas, PTA GP 1    88490901552 HC PT MANUAL THERAPY EA 15 MIN 6/25/2018 Ashkan Borjas, PTA GP 2                    Ashkan Borjas, PTA  6/25/2018

## 2018-06-27 ENCOUNTER — OFFICE VISIT (OUTPATIENT)
Dept: NEUROSURGERY | Facility: CLINIC | Age: 63
End: 2018-06-27

## 2018-06-27 VITALS — WEIGHT: 208 LBS | BODY MASS INDEX: 32.65 KG/M2 | HEIGHT: 67 IN

## 2018-06-27 DIAGNOSIS — G56.03 BILATERAL CARPAL TUNNEL SYNDROME: Primary | ICD-10-CM

## 2018-06-27 DIAGNOSIS — Z78.9 NONSMOKER: ICD-10-CM

## 2018-06-27 PROCEDURE — 99213 OFFICE O/P EST LOW 20 MIN: CPT | Performed by: NEUROLOGICAL SURGERY

## 2018-06-27 NOTE — PROGRESS NOTES
"    Chief complaint   Chief Complaint   Patient presents with   • Neck Pain        Subjective     HPI:     This patient is a 62-year-old male who has numbness and tingling in the first 4 digits of his hands.  Both his left and his right hand are affected equally.  He has had the symptoms for the past 4 months.  He has undergone physical therapy and he wears wrist splints, but he has not had significant improvement in symptoms.  He has had an EMG nerve conduction study test which shows bilateral carpal tunnel syndrome.  He presents for surgical discussion.    Review of Systems     A 12 point review of systems is obtained and is negative except for as described in HPI    Past Medical History:   Diagnosis Date   • Diabetes mellitus    • Disease of thyroid gland    • Hyperlipidemia      Past Surgical History:   Procedure Laterality Date   • MOLE REMOVAL     • VASECTOMY       Family History   Problem Relation Age of Onset   • Diabetes Mother    • Hypertension Mother    • Diabetes Father    • Colon polyps Father    • Diabetes Sister    • Diabetes Brother    • Hypertension Brother    • Colon polyps Brother    • Diabetes Brother    • Hypertension Brother    • Diabetes Brother    • Hypertension Brother    • Diabetes Sister    • Throat cancer Maternal Grandfather      Social History   Substance Use Topics   • Smoking status: Never Smoker   • Smokeless tobacco: Former User   • Alcohol use Yes      Comment: occ       (Not in a hospital admission)  Allergies:  Patient has no known allergies.    Objective      Vital Signs  Ht 170.2 cm (67\")   Wt 94.3 kg (208 lb)   BMI 32.58 kg/m²     Physical Exam    No acute distress  Awake alert oriented ×3  HEENT atraumatic normocephalic  Neck supple  Neurologic exam  Cranial nerves II through XII grossly intact  Patient moves all extremities well  Sensation is intact light touch in upper and lower extremities  Positive Phalen sign    Results Review:     EMG nerve conduction study test shows " bilateral carpal tunnel syndrome          Assessment/Plan:     62-year-old male with bilateral carpal tunnel syndrome.  I have discussed the risks, benefits, alternatives of carpal tunnel release with the patient.  I have also discussed alternatives to surgery including injections.  Patient and his wife voiced understanding.  Patient would like to proceed with a trial of steroid injections into his wrist.  We will refer him to pain management and follow-up with him in 6 weeks.  If he decides that he wishes to proceed with surgery, he may notify the office.  Thank you for this consultation.    I discussed the patients findings and my recommendations with patient    Freedom Guy MD  06/27/18  9:39 AM

## 2018-06-27 NOTE — PATIENT INSTRUCTIONS

## 2018-06-29 ENCOUNTER — HOSPITAL ENCOUNTER (OUTPATIENT)
Dept: PHYSICAL THERAPY | Facility: HOSPITAL | Age: 63
Setting detail: THERAPIES SERIES
Discharge: HOME | End: 2018-06-29

## 2018-06-29 DIAGNOSIS — G56.03 BILATERAL CARPAL TUNNEL SYNDROME: ICD-10-CM

## 2018-06-29 DIAGNOSIS — R20.0 NUMBNESS IN BOTH HANDS: ICD-10-CM

## 2018-06-29 DIAGNOSIS — M54.2 NECK PAIN: Primary | ICD-10-CM

## 2018-06-29 PROCEDURE — 97140 MANUAL THERAPY 1/> REGIONS: CPT | Performed by: PHYSICAL THERAPIST

## 2018-06-29 NOTE — THERAPY TREATMENT NOTE
Outpatient Physical Therapy Ortho Treatment Note  [facility]     Patient Name: Adam Ferris  : 1955  MRN: [id number]  Today's Date: 2018      Visit Date: 2018    Visit Dx:    ICD-10-CM ICD-9-CM   1. Neck pain M54.2 723.1   2. Numbness in both hands R20.0 782.0   3. Bilateral carpal tunnel syndrome G56.03 354.0       Patient Active Problem List   Diagnosis   • Neck pain   • Numbness in both hands   • BMI 32.0-32.9,adult   • Nonsmoker   • Hand weakness   • Bilateral carpal tunnel syndrome   • Encounter for screening for malignant neoplasm of colon        Past Medical History:   Diagnosis Date   • Diabetes mellitus    • Disease of thyroid gland    • Hyperlipidemia         Past Surgical History:   Procedure Laterality Date   • MOLE REMOVAL     • VASECTOMY                               PT Assessment/Plan     Row Name 18 1700          PT Assessment    Assessment Comments He still has intermittent numbness in his hands. His pain is staying relatively low.   -TB        PT Plan    PT Plan Comments Cont to work on mobility through his neck and wrists.  -TB       User Key  (r) = Recorded By, (t) = Taken By, (c) = Cosigned By    Initials Name Provider Type    TB Slick Wick, PT Physical Therapist                    Exercises     Row Name 18 1700 18 1545          Subjective Comments    Subjective Comments  -- He says his hands are about the same. Some days his neck feels pretty good. He's had some neck pain today.  -TB        Subjective Pain    Pre-Treatment Pain Level  -- 1  -TB        Total Minutes    47588 - PT Manual Therapy Minutes 45  -TB  --       User Key  (r) = Recorded By, (t) = Taken By, (c) = Cosigned By    Initials Name Provider Type    MARGUERITE Wick, PT Physical Therapist                        Manual Rx (last 36 hours)      Manual Treatments     Row Name 18 1700             Total Minutes    18132 - PT Manual Therapy Minutes 45  -TB         Manual Rx 1     Manual Rx 1 Location prone thoracic  -TB      Manual Rx 1 Type ext mobs with foam roll and manual  -TB      Manual Rx 1 Grade 3 repetitive  -TB      Manual Rx 1 Duration 10  -TB         Manual Rx 2    Manual Rx 2 Location prone CT  -TB      Manual Rx 2 Type ext mob  -TB      Manual Rx 2 Grade 3 sustained  -TB      Manual Rx 2 Duration 15  -TB         Manual Rx 3    Manual Rx 3 Location supine manual cx traction  -TB      Manual Rx 3 Grade 3 sustained   -TB      Manual Rx 3 Duration 10  -TB         Manual Rx 4    Manual Rx 4 Location kinga carpal mobs  -TB      Manual Rx 4 Type PA and splaying  -TB      Manual Rx 4 Grade 3 repetitive  -TB      Manual Rx 4 Duration 5 min each  -TB        User Key  (r) = Recorded By, (t) = Taken By, (c) = Cosigned By    Initials Name Provider Type    MARGUERITE Wick PT Physical Therapist                PT OP Goals     Row Name 06/29/18 1700          Long Term Goals    LTG Date to Achieve 07/27/18  -TB     LTG 1 No numbness in his hands for a week  -TB     LTG 1 Progress Ongoing  -TB     LTG 1 Progress Comments still some intermittent tingling  -TB     LTG 2 Improve neck extension to 50%  -TB     LTG 2 Progress Ongoing  -TB     LTG 3 Improve neck pain to <25% of the day avg of 1-2/10 when it occurs  -TB     LTG 3 Progress Ongoing  -TB     LTG 4 Able to type and work without exacerbation of hand numbness  -TB     LTG 4 Progress Ongoing  -TB     LTG 5 Ind with HEP for flexibility and ergonomics.   -TB     LTG 5 Progress Ongoing  -TB        Time Calculation    PT Goal Re-Cert Due Date 07/15/18  -TB       User Key  (r) = Recorded By, (t) = Taken By, (c) = Cosigned By    Initials Name Provider Type    MARGUERITE Wick PT Physical Therapist          Therapy Education  Given: HEP  Program: Reinforced  How Provided: Verbal  Provided to: Patient  Level of Understanding: Verbalized              Time Calculation:   Start Time: 1545  Stop Time: 1630  Time Calculation (min): 45 min  Total Timed  Code Minutes- PT: 45 minute(s)  Therapy Suggested Charges     Code   Minutes Charges    91804 (CPT®) Hc Pt Neuromusc Re Education Ea 15 Min      34684 (CPT®) Hc Pt Ther Proc Ea 15 Min      90120 (CPT®) Hc Gait Training Ea 15 Min      42831 (CPT®) Hc Pt Therapeutic Act Ea 15 Min      38243 (CPT®) Hc Pt Manual Therapy Ea 15 Min 45 3    72411 (CPT®) Hc Pt Ther Massage- Per 15 Min      80111 (CPT®) Hc Pt Iontophoresis Ea 15 Min      37506 (CPT®) Hc Pt Elec Stim Ea-Per 15 Min      45852 (CPT®) Hc Pt Ultrasound Ea 15 Min      76937 (CPT®) Hc Pt Self Care/Mgmt/Train Ea 15 Min      Total  45 3        Therapy Charges for Today     Code Description Service Date Service Provider Modifiers Qty    72828912518 HC PT MANUAL THERAPY EA 15 MIN 6/29/2018 Slick Wick, PT GP 3                    Slick Wick, PT  6/29/2018

## 2018-07-03 ENCOUNTER — HOSPITAL ENCOUNTER (OUTPATIENT)
Dept: PHYSICAL THERAPY | Facility: HOSPITAL | Age: 63
Setting detail: THERAPIES SERIES
Discharge: HOME | End: 2018-07-03

## 2018-07-03 DIAGNOSIS — R20.0 NUMBNESS IN BOTH HANDS: ICD-10-CM

## 2018-07-03 DIAGNOSIS — M54.2 NECK PAIN: Primary | ICD-10-CM

## 2018-07-03 DIAGNOSIS — G56.03 BILATERAL CARPAL TUNNEL SYNDROME: ICD-10-CM

## 2018-07-03 PROCEDURE — 97140 MANUAL THERAPY 1/> REGIONS: CPT | Performed by: PHYSICAL THERAPIST

## 2018-07-03 NOTE — THERAPY TREATMENT NOTE
Outpatient Physical Therapy Ortho Treatment Note  [facility]     Patient Name: Adam Ferris  : 1955  MRN: [id number]  Today's Date: 7/3/2018      Visit Date: 2018    Visit Dx:    ICD-10-CM ICD-9-CM   1. Neck pain M54.2 723.1   2. Numbness in both hands R20.0 782.0   3. Bilateral carpal tunnel syndrome G56.03 354.0       Patient Active Problem List   Diagnosis   • Neck pain   • Numbness in both hands   • BMI 32.0-32.9,adult   • Nonsmoker   • Hand weakness   • Bilateral carpal tunnel syndrome   • Encounter for screening for malignant neoplasm of colon        Past Medical History:   Diagnosis Date   • Diabetes mellitus (CMS/HCC)    • Disease of thyroid gland    • Hyperlipidemia         Past Surgical History:   Procedure Laterality Date   • MOLE REMOVAL     • VASECTOMY                               PT Assessment/Plan     Row Name 18 0800          PT Assessment    Assessment Comments He'ss still having hand pain. His neck pain has been doing well.  -TB        PT Plan    PT Plan Comments Cont with neck/thor and wrist mobs.  -TB       User Key  (r) = Recorded By, (t) = Taken By, (c) = Cosigned By    Initials Name Provider Type    MARGUERITE Wick, PT Physical Therapist                    Exercises     Row Name 18 0800             Subjective Comments    Subjective Comments He's alright but his hands are still giving him problems  -TB         Subjective Pain    Pre-Treatment Pain Level 1  -TB         Total Minutes    47119 - PT Manual Therapy Minutes 45  -TB        User Key  (r) = Recorded By, (t) = Taken By, (c) = Cosigned By    Initials Name Provider Type    MARGUERITE Wick, PT Physical Therapist                        Manual Rx (last 36 hours)      Manual Treatments     Row Name 18 0800             Manual Rx 1    Manual Rx 1 Location prone thoracic  -TB      Manual Rx 1 Type ext mobs with foam roll and manual  -TB      Manual Rx 1 Grade 3 repetitive  -TB      Manual Rx 1 Duration  10  -TB         Manual Rx 2    Manual Rx 2 Location prone CT  -TB      Manual Rx 2 Type ext mob  -TB      Manual Rx 2 Grade 3 sustained  -TB      Manual Rx 2 Duration 15  -TB         Manual Rx 3    Manual Rx 3 Location supine manual cx traction  -TB      Manual Rx 3 Type extension mobilizations  -TB      Manual Rx 3 Grade 3 sustained   -TB      Manual Rx 3 Duration 10  -TB         Manual Rx 4    Manual Rx 4 Location kinga carpal mobs  -TB      Manual Rx 4 Type PA and splaying  -TB      Manual Rx 4 Grade 3 repetitive  -TB      Manual Rx 4 Duration 5 min each  -TB        User Key  (r) = Recorded By, (t) = Taken By, (c) = Cosigned By    Initials Name Provider Type    TB Slick Wick PT Physical Therapist                PT OP Goals     Row Name 07/03/18 0800          Long Term Goals    LTG Date to Achieve 07/27/18  -TB     LTG 1 No numbness in his hands for a week  -TB     LTG 1 Progress Ongoing  -TB     LTG 1 Progress Comments still with hand numbness in handds.  -TB     LTG 2 Improve neck extension to 50%  -TB     LTG 2 Progress Ongoing  -TB     LTG 3 Improve neck pain to <25% of the day avg of 1-2/10 when it occurs  -TB     LTG 3 Progress Ongoing  -TB     LTG 4 Able to type and work without exacerbation of hand numbness  -TB     LTG 4 Progress Ongoing  -TB     LTG 5 Ind with HEP for flexibility and ergonomics.   -TB     LTG 5 Progress Ongoing  -TB        Time Calculation    PT Goal Re-Cert Due Date 07/15/18  -TB       User Key  (r) = Recorded By, (t) = Taken By, (c) = Cosigned By    Initials Name Provider Type    MARGUERITE Wick, CHELO Physical Therapist          Therapy Education  Given: HEP  Program: Reinforced  How Provided: Verbal  Provided to: Patient  Level of Understanding: Verbalized              Time Calculation:   Start Time: 0800  Stop Time: 0845  Time Calculation (min): 45 min  Total Timed Code Minutes- PT: 45 minute(s)  Therapy Suggested Charges     Code   Minutes Charges    19209 (CPT®) Hc Pt  Neuromusc Re Education Ea 15 Min      08483 (CPT®) Hc Pt Ther Proc Ea 15 Min      64748 (CPT®) Hc Gait Training Ea 15 Min      31326 (CPT®) Hc Pt Therapeutic Act Ea 15 Min      47859 (CPT®) Hc Pt Manual Therapy Ea 15 Min 45 3    97701 (CPT®) Hc Pt Ther Massage- Per 15 Min      05247 (CPT®) Hc Pt Iontophoresis Ea 15 Min      37645 (CPT®) Hc Pt Elec Stim Ea-Per 15 Min      02524 (CPT®) Hc Pt Ultrasound Ea 15 Min      07088 (CPT®) Hc Pt Self Care/Mgmt/Train Ea 15 Min      Total  45 3        Therapy Charges for Today     Code Description Service Date Service Provider Modifiers Qty    21168327739 HC PT MANUAL THERAPY EA 15 MIN 7/3/2018 Slick Wick, PT GP 3                    Slick Wick, PT  7/3/2018

## 2018-07-06 ENCOUNTER — HOSPITAL ENCOUNTER (OUTPATIENT)
Dept: PHYSICAL THERAPY | Facility: HOSPITAL | Age: 63
Setting detail: THERAPIES SERIES
Discharge: HOME | End: 2018-07-06

## 2018-07-06 DIAGNOSIS — G56.03 BILATERAL CARPAL TUNNEL SYNDROME: ICD-10-CM

## 2018-07-06 DIAGNOSIS — M54.2 NECK PAIN: Primary | ICD-10-CM

## 2018-07-06 DIAGNOSIS — R20.0 NUMBNESS IN BOTH HANDS: ICD-10-CM

## 2018-07-06 PROCEDURE — 97140 MANUAL THERAPY 1/> REGIONS: CPT | Performed by: PHYSICAL THERAPIST

## 2018-07-06 NOTE — THERAPY TREATMENT NOTE
Outpatient Physical Therapy Ortho Treatment Note  [facility]     Patient Name: Adam Ferris  : 1955  MRN: [id number]  Today's Date: 2018      Visit Date: 2018    Visit Dx:    ICD-10-CM ICD-9-CM   1. Neck pain M54.2 723.1   2. Numbness in both hands R20.0 782.0   3. Bilateral carpal tunnel syndrome G56.03 354.0       Patient Active Problem List   Diagnosis   • Neck pain   • Numbness in both hands   • BMI 32.0-32.9,adult   • Nonsmoker   • Hand weakness   • Bilateral carpal tunnel syndrome   • Encounter for screening for malignant neoplasm of colon        Past Medical History:   Diagnosis Date   • Diabetes mellitus (CMS/HCC)    • Disease of thyroid gland    • Hyperlipidemia         Past Surgical History:   Procedure Laterality Date   • MOLE REMOVAL     • VASECTOMY                               PT Assessment/Plan     Row Name 18 1600          PT Assessment    Assessment Comments He's still struggling with hand numbness and stiffness.   -TB        PT Plan    PT Plan Comments Cont with more emphasis on carpals mobs.  -TB       User Key  (r) = Recorded By, (t) = Taken By, (c) = Cosigned By    Initials Name Provider Type    TB Slick Wick, PT Physical Therapist                    Exercises     Row Name 18 1600 18 1550          Subjective Comments    Subjective Comments  -- He says some ways his hands are worse and some better.  They feel stiffer.   -TB        Subjective Pain    Pre-Treatment Pain Level  -- 0  -TB        Total Minutes    40376 - PT Manual Therapy Minutes --  -TB 45  -TB       User Key  (r) = Recorded By, (t) = Taken By, (c) = Cosigned By    Initials Name Provider Type    MARGUERITE Wick, PT Physical Therapist                        Manual Rx (last 36 hours)      Manual Treatments     Row Name 18 1600 18 1550          Total Minutes    29260 - PT Manual Therapy Minutes --  -TB 45  -TB        Manual Rx 1    Manual Rx 1 Location  -- prone thoracic  -TB      Manual Rx 1 Type  -- ext mobs with foam roll and manual  -TB     Manual Rx 1 Grade  -- 3 repetitive  -TB     Manual Rx 1 Duration  -- 10  -TB        Manual Rx 2    Manual Rx 2 Location  -- prone CT  -TB     Manual Rx 2 Type  -- ext mob  -TB     Manual Rx 2 Grade  -- 3 sustained  -TB     Manual Rx 2 Duration  -- 15  -TB        Manual Rx 3    Manual Rx 3 Location  -- supine manual cx traction  -TB     Manual Rx 3 Type  -- extension mobilizations  -TB     Manual Rx 3 Grade  -- 3 sustained   -TB     Manual Rx 3 Duration  -- 10  -TB        Manual Rx 4    Manual Rx 4 Location  -- kinga carpal mobs  -TB     Manual Rx 4 Type  -- PA and splaying  -TB     Manual Rx 4 Grade  -- 3 repetitive  -TB     Manual Rx 4 Duration  -- 5 min each  -TB       User Key  (r) = Recorded By, (t) = Taken By, (c) = Cosigned By    Initials Name Provider Type    MARGUERITE Wick, PT Physical Therapist                PT OP Goals     Row Name 07/06/18 1600          Long Term Goals    LTG Date to Achieve 07/27/18  -TB     LTG 1 No numbness in his hands for a week  -TB     LTG 1 Progress Ongoing  -TB     LTG 1 Progress Comments still numbness, unchanged  -TB     LTG 2 Improve neck extension to 50%  -TB     LTG 2 Progress Ongoing  -TB     LTG 3 Improve neck pain to <25% of the day avg of 1-2/10 when it occurs  -TB     LTG 3 Progress Ongoing  -TB     LTG 4 Able to type and work without exacerbation of hand numbness  -TB     LTG 4 Progress Ongoing  -TB     LTG 5 Ind with HEP for flexibility and ergonomics.   -TB     LTG 5 Progress Ongoing  -TB        Time Calculation    PT Goal Re-Cert Due Date 07/15/18  -TB       User Key  (r) = Recorded By, (t) = Taken By, (c) = Cosigned By    Initials Name Provider Type    MARGUERITE Wick, PT Physical Therapist          Therapy Education  Given: HEP  Program: Reinforced  How Provided: Verbal  Provided to: Patient  Level of Understanding: Verbalized              Time Calculation:   Start Time: 1550  Stop Time:  1635  Time Calculation (min): 45 min  Total Timed Code Minutes- PT: 45 minute(s)  Therapy Suggested Charges     Code   Minutes Charges    29964 (CPT®) Hc Pt Neuromusc Re Education Ea 15 Min      54606 (CPT®) Hc Pt Ther Proc Ea 15 Min      31606 (CPT®) Hc Gait Training Ea 15 Min      23756 (CPT®) Hc Pt Therapeutic Act Ea 15 Min      82054 (CPT®) Hc Pt Manual Therapy Ea 15 Min 45 3    33573 (CPT®) Hc Pt Ther Massage- Per 15 Min      38466 (CPT®) Hc Pt Iontophoresis Ea 15 Min      18151 (CPT®) Hc Pt Elec Stim Ea-Per 15 Min      47773 (CPT®) Hc Pt Ultrasound Ea 15 Min      89286 (CPT®) Hc Pt Self Care/Mgmt/Train Ea 15 Min      Total  45 3        Therapy Charges for Today     Code Description Service Date Service Provider Modifiers Qty    28447888627 HC PT MANUAL THERAPY EA 15 MIN 7/6/2018 Slick Wick, PT GP 3                    Slick Wick, PT  7/6/2018

## 2018-07-10 ENCOUNTER — HOSPITAL ENCOUNTER (OUTPATIENT)
Dept: PHYSICAL THERAPY | Facility: HOSPITAL | Age: 63
Setting detail: THERAPIES SERIES
Discharge: HOME | End: 2018-07-10

## 2018-07-10 DIAGNOSIS — M54.2 NECK PAIN: Primary | ICD-10-CM

## 2018-07-10 DIAGNOSIS — R20.0 NUMBNESS IN BOTH HANDS: ICD-10-CM

## 2018-07-10 DIAGNOSIS — G56.03 BILATERAL CARPAL TUNNEL SYNDROME: ICD-10-CM

## 2018-07-10 PROCEDURE — 97140 MANUAL THERAPY 1/> REGIONS: CPT | Performed by: PHYSICAL THERAPIST

## 2018-07-10 NOTE — THERAPY TREATMENT NOTE
Outpatient Physical Therapy Ortho Treatment Note  [facility]     Patient Name: Adam Ferris  : 1955  MRN: [id number]  Today's Date: 7/10/2018      Visit Date: 07/10/2018    Visit Dx:    ICD-10-CM ICD-9-CM   1. Neck pain M54.2 723.1   2. Numbness in both hands R20.0 782.0   3. Bilateral carpal tunnel syndrome G56.03 354.0       Patient Active Problem List   Diagnosis   • Neck pain   • Numbness in both hands   • BMI 32.0-32.9,adult   • Nonsmoker   • Hand weakness   • Bilateral carpal tunnel syndrome   • Encounter for screening for malignant neoplasm of colon        Past Medical History:   Diagnosis Date   • Diabetes mellitus (CMS/HCC)    • Disease of thyroid gland    • Hyperlipidemia         Past Surgical History:   Procedure Laterality Date   • MOLE REMOVAL     • VASECTOMY                               PT Assessment/Plan     Row Name 07/10/18 1600          PT Assessment    Assessment Comments He felt like by doing his HEP more, he was capable of getting some relief in his hands.   -TB        PT Plan    PT Plan Comments Continue with cervical mobs and carpal mobs.   -TB       User Key  (r) = Recorded By, (t) = Taken By, (c) = Cosigned By    Initials Name Provider Type    MARGUERITE Wick, PT Physical Therapist                    Exercises     Row Name 07/10/18 1600             Subjective Comments    Subjective Comments He did more exs today and felt like it helped his hands.   -TB         Subjective Pain    Pre-Treatment Pain Level 0  -TB         Total Minutes    68340 - PT Manual Therapy Minutes 45  -TB        User Key  (r) = Recorded By, (t) = Taken By, (c) = Cosigned By    Initials Name Provider Type    MARGUERITE Wick, PT Physical Therapist                        Manual Rx (last 36 hours)      Manual Treatments     Row Name 07/10/18 1545             Manual Rx 1    Manual Rx 1 Location prone thoracic  -TB      Manual Rx 1 Type ext mobs with foam roll and manual  -TB      Manual Rx 1 Grade 3  repetitive  -TB      Manual Rx 1 Duration 10  -TB         Manual Rx 2    Manual Rx 2 Location prone CT  -TB      Manual Rx 2 Type ext mob  -TB      Manual Rx 2 Grade 3 sustained  -TB      Manual Rx 2 Duration 15  -TB         Manual Rx 3    Manual Rx 3 Location supine manual cx traction  -TB      Manual Rx 3 Type extension mobilizations  -TB      Manual Rx 3 Grade 3 sustained   -TB      Manual Rx 3 Duration 10  -TB         Manual Rx 4    Manual Rx 4 Location kinga carpal mobs  -TB      Manual Rx 4 Type PA and splaying  -TB      Manual Rx 4 Grade 3 repetitive  -TB      Manual Rx 4 Duration 5 min each  -TB        User Key  (r) = Recorded By, (t) = Taken By, (c) = Cosigned By    Initials Name Provider Type    TB Slick Wick PT Physical Therapist                PT OP Goals     Row Name 07/10/18 1600          Long Term Goals    LTG Date to Achieve 07/27/18  -TB     LTG 1 No numbness in his hands for a week  -TB     LTG 1 Progress Ongoing  -TB     LTG 1 Progress Comments still numbness  -TB     LTG 2 Improve neck extension to 50%  -TB     LTG 2 Progress Ongoing  -TB     LTG 3 Improve neck pain to <25% of the day avg of 1-2/10 when it occurs  -TB     LTG 3 Progress Ongoing  -TB     LTG 4 Able to type and work without exacerbation of hand numbness  -TB     LTG 4 Progress Ongoing  -TB     LTG 5 Ind with HEP for flexibility and ergonomics.   -TB     LTG 5 Progress Ongoing  -TB        Time Calculation    PT Goal Re-Cert Due Date 07/15/18  -TB       User Key  (r) = Recorded By, (t) = Taken By, (c) = Cosigned By    Initials Name Provider Type    MARGUERITE Wick PT Physical Therapist          Therapy Education  Given: HEP  Program: Reinforced  How Provided: Verbal  Provided to: Patient  Level of Understanding: Verbalized              Time Calculation:   Start Time: 1545  Stop Time: 1630  Time Calculation (min): 45 min  Total Timed Code Minutes- PT: 45 minute(s)  Therapy Suggested Charges     Code   Minutes Charges    02198  (CPT®) Hc Pt Neuromusc Re Education Ea 15 Min      38676 (CPT®) Hc Pt Ther Proc Ea 15 Min      92311 (CPT®) Hc Gait Training Ea 15 Min      13963 (CPT®) Hc Pt Therapeutic Act Ea 15 Min      70307 (CPT®) Hc Pt Manual Therapy Ea 15 Min 45 3    46260 (CPT®) Hc Pt Ther Massage- Per 15 Min      75662 (CPT®) Hc Pt Iontophoresis Ea 15 Min      90528 (CPT®) Hc Pt Elec Stim Ea-Per 15 Min      04297 (CPT®) Hc Pt Ultrasound Ea 15 Min      36013 (CPT®) Hc Pt Self Care/Mgmt/Train Ea 15 Min      Total  45 3        Therapy Charges for Today     Code Description Service Date Service Provider Modifiers Qty    23557288720 HC PT MANUAL THERAPY EA 15 MIN 7/10/2018 Slick Wick, PT GP 3                    Slick Wick, PT  7/10/2018

## 2018-07-13 ENCOUNTER — HOSPITAL ENCOUNTER (OUTPATIENT)
Dept: PHYSICAL THERAPY | Facility: HOSPITAL | Age: 63
Setting detail: THERAPIES SERIES
Discharge: HOME | End: 2018-07-13

## 2018-07-13 DIAGNOSIS — M54.2 NECK PAIN: Primary | ICD-10-CM

## 2018-07-13 DIAGNOSIS — G56.03 BILATERAL CARPAL TUNNEL SYNDROME: ICD-10-CM

## 2018-07-13 DIAGNOSIS — R20.0 NUMBNESS IN BOTH HANDS: ICD-10-CM

## 2018-07-13 PROCEDURE — 97140 MANUAL THERAPY 1/> REGIONS: CPT

## 2018-07-13 PROCEDURE — 97110 THERAPEUTIC EXERCISES: CPT

## 2018-07-13 NOTE — THERAPY PROGRESS REPORT/RE-CERT
Outpatient Physical Therapy Ortho Progress Note   [place]     Patient Name: Adam Ferrsi  : 1955  MRN: [id number]  Today's Date: 2018      Visit Date: 2018    Visit Dx:    ICD-10-CM ICD-9-CM   1. Neck pain M54.2 723.1   2. Numbness in both hands R20.0 782.0   3. Bilateral carpal tunnel syndrome G56.03 354.0       Patient Active Problem List   Diagnosis   • Neck pain   • Numbness in both hands   • BMI 32.0-32.9,adult   • Nonsmoker   • Hand weakness   • Bilateral carpal tunnel syndrome   • Encounter for screening for malignant neoplasm of colon        Past Medical History:   Diagnosis Date   • Diabetes mellitus (CMS/HCC)    • Disease of thyroid gland    • Hyperlipidemia         Past Surgical History:   Procedure Laterality Date   • MOLE REMOVAL     • VASECTOMY                               PT Assessment/Plan     Row Name 18 0700          PT Assessment    Functional Limitations Limitation in home management;Performance in work activities;Limitations in functional capacity and performance;Performance in leisure activities;Performance in self-care ADL  -RS (r) PD (t) RS (c)     Impairments Posture;Range of motion;Pain;Sensation;Poor body mechanics  -RS (r) PD (t) RS (c)     Assessment Comments Patient is doing well with therapy and continues to demonstrate improvements in his cervical mobility and overall pain level. Patient does continue to experience intermittent numbness/tingling in his hands, bilaterally, which is normal given his individual impairments and the chronic nature of his condition. Patient would benefit from additional therapy to improve the mobility of the cervical spine and carpals, bilaterally. Patient will also benefit from the progression of his exercise program to improve his poor postural control.  -RS (r) PD (t) RS (c)     Please refer to paper survey for additional self-reported information Yes  -RS (r) PD (t) RS (c)     Rehab Potential Good  -RS (r) PD (t) RS (c)      Patient/caregiver participated in establishment of treatment plan and goals Yes  -RS (r) PD (t) RS (c)     Patient would benefit from skilled therapy intervention Yes  -RS (r) PD (t) RS (c)        PT Plan    PT Frequency 2x/week  -RS (r) PD (t) RS (c)     Predicted Duration of Therapy Intervention (Therapy Eval) 3-4 weeks  -RS     Planned CPT's? PT THER PROC EA 15 MIN: 78962;PT MANUAL THERAPY EA 15 MIN: 00735;PT ELECTRICAL STIM ATTD EA 15 MIN: 92712;PT TRACTION CERVICAL: 04007  -RS (r) PD (t) RS (c)     PT Plan Comments We will continue to work on improving the mobility of his cervical spine and bilateral carpals. We will also continue to progress his exercise program to improve his postural control, which will reduce the stress on the lower cervical spine.  -RS (r) PD (t) RS (c)       User Key  (r) = Recorded By, (t) = Taken By, (c) = Cosigned By    Initials Name Provider Type    RS Hung Anthony, PT, DPT, OCS Physical Therapist    PD Marcus Cuello, PT Student PT Student                    Exercises     Row Name 07/13/18 0700             Subjective Comments    Subjective Comments Patient reports his numbness/tingling persists today. He reports his mid back was painful after his last treatment session, which lasted for about 30 minutes before subsiding.  -RS (r) PD (t) RS (c)         Subjective Pain    Able to rate subjective pain? yes  -RS (r) PD (t) RS (c)      Pre-Treatment Pain Level 3   left-sided headache and neck pain  -RS (r) PD (t) RS (c)      Post-Treatment Pain Level 1  -RS (r) PD (t) RS (c)         Total Minutes    04238 - PT Therapeutic Exercise Minutes 14  -RS (r) PD (t) RS (c)      52489 - PT Manual Therapy Minutes 24  -RS (r) PD (t) RS (c)         Exercise 1    Exercise Name 1 supine horizontal abduction with green Theraband  -RS (r) PD (t) RS (c)      Cueing 1 Demo;Verbal  -RS (r) PD (t) RS (c)      Sets 1 3  -RS (r) PD (t) RS (c)      Reps 1 10  -RS (r) PD (t) RS (c)      Time 1 3 sec  hold  -RS (r) PD (t) RS (c)         Exercise 2    Exercise Name 2 seated shoulder ER with   -RS (r) PD (t) RS (c)      Cueing 2 Demo;Tactile  -RS (r) PD (t) RS (c)      Sets 2 3  -RS (r) PD (t) RS (c)      Reps 2 10  -RS (r) PD (t) RS (c)      Time 2 3 sec hold  -RS (r) PD (t) RS (c)        User Key  (r) = Recorded By, (t) = Taken By, (c) = Cosigned By    Initials Name Provider Type    RS Hung Anthony, PT, DPT, OCS Physical Therapist    PD Marcus Cuello, PT Student PT Student                        Manual Rx (last 36 hours)      Manual Treatments     Row Name 07/13/18 0700             Total Minutes    67981 - PT Manual Therapy Minutes 24  -RS (r) PD (t) RS (c)         Manual Rx 1    Manual Rx 1 Location prone thoracic  -RS (r) PD (t) RS (c)      Manual Rx 1 Type P-A mobilizations  -RS (r) PD (t) RS (c)      Manual Rx 1 Grade 3 repetitive  -RS (r) PD (t) RS (c)      Manual Rx 1 Duration 8 min  -RS (r) PD (t) RS (c)         Manual Rx 2    Manual Rx 2 Location prone CT  -RS (r) PD (t) RS (c)      Manual Rx 2 Type ext mob  -RS (r) PD (t) RS (c)      Manual Rx 2 Grade 3 sustained  -RS (r) PD (t) RS (c)      Manual Rx 2 Duration 5 min  -RS (r) PD (t) RS (c)         Manual Rx 3    Manual Rx 3 Location supine manual cx traction  -RS (r) PD (t) RS (c)      Manual Rx 3 Type extension mobilizations  -RS (r) PD (t) RS (c)      Manual Rx 3 Grade 3 sustained   -RS (r) PD (t) RS (c)      Manual Rx 3 Duration 8 min  -RS (r) PD (t) RS (c)         Manual Rx 4    Manual Rx 4 Location (L) C1-C2  -RS (r) PD (t) RS (c)      Manual Rx 4 Type supine P-A  -RS (r) PD (t) RS (c)      Manual Rx 4 Grade 3 repetitive  -RS (r) PD (t) RS (c)      Manual Rx 4 Duration 3 min  -RS (r) PD (t) RS (c)         Manual Rx 5    Manual Rx 5 Location --  -RS (r) PD (t) RS (c)      Manual Rx 5 Type --  -RS (r) PD (t) RS (c)      Manual Rx 5 Grade --  -RS (r) PD (t) RS (c)        User Key  (r) = Recorded By, (t) = Taken By, (c) = Cosigned By     Initials Name Provider Type    RS Hung Davian Anthony, PT, DPT, OCS Physical Therapist    PD Marcus Cuello, PT Student PT Student                PT OP Goals     Row Name 07/13/18 0700          Long Term Goals    LTG Date to Achieve 07/27/18  -RS (r) PD (t) RS (c)     LTG 1 No numbness in his hands for a week  -RS (r) PD (t) RS (c)     LTG 1 Progress Ongoing  -RS (r) PD (t) RS (c)     LTG 1 Progress Comments Patient reports he continues to experience numbness/tingling, but he admits it has improved in severity over the past week.  -RS (r) PD (t) RS (c)     LTG 2 Improve neck extension to 50%  -RS (r) PD (t) RS (c)     LTG 2 Progress Ongoing  -RS (r) PD (t) RS (c)     LTG 2 Progress Comments 45 degrees active cervical extension this date  -RS (r) PD (t) RS (c)     LTG 3 Improve neck pain to <25% of the day avg of 1-2/10 when it occurs  -RS (r) PD (t) RS (c)     LTG 3 Progress Ongoing  -RS (r) PD (t) RS (c)     LTG 3 Progress Comments Patient continues to consistently report intermittent 3/10 neck pain throughout the day.  -RS (r) PD (t) RS (c)     LTG 4 Able to type and work without exacerbation of hand numbness  -RS (r) PD (t) RS (c)     LTG 4 Progress Ongoing  -RS (r) PD (t) RS (c)     LTG 4 Progress Comments Patient reports he continues to experience increased numbness in his hands with typing and writing.  -RS (r) PD (t) RS (c)     LTG 5 Ind with HEP for flexibility and ergonomics.   -RS (r) PD (t) RS (c)     LTG 5 Progress Ongoing  -RS (r) PD (t) RS (c)     LTG 5 Progress Comments Patient reports he continues to be compliant with HEP and believes the exercises are helping.  -RS (r) PD (t) RS (c)        Time Calculation    PT Goal Re-Cert Due Date 07/15/18  -RS (r) PD (t) RS (c)       User Key  (r) = Recorded By, (t) = Taken By, (c) = Cosigned By    Initials Name Provider Type    RS Hung Anthony, PT, DPT, OCS Physical Therapist    PD Marcus Cuello, PT Student PT Student          Therapy  Education  Given: HEP, Symptoms/condition management, Posture/body mechanics  Program: Reinforced  How Provided: Verbal  Provided to: Patient  Level of Understanding: Verbalized              Time Calculation:   Start Time: 0748  Stop Time: 0828  Time Calculation (min): 40 min  Total Timed Code Minutes- PT: 38 minute(s)  Therapy Suggested Charges     Code   Minutes Charges    58895 (CPT®) Hc Pt Neuromusc Re Education Ea 15 Min      39086 (CPT®) Hc Pt Ther Proc Ea 15 Min 14 1    06796 (CPT®) Hc Gait Training Ea 15 Min      04621 (CPT®) Hc Pt Therapeutic Act Ea 15 Min      31381 (CPT®) Hc Pt Manual Therapy Ea 15 Min 24 2    22965 (CPT®) Hc Pt Ther Massage- Per 15 Min      76354 (CPT®) Hc Pt Iontophoresis Ea 15 Min      20319 (CPT®) Hc Pt Elec Stim Ea-Per 15 Min      67597 (CPT®) Hc Pt Ultrasound Ea 15 Min      56932 (CPT®) Hc Pt Self Care/Mgmt/Train Ea 15 Min      Total  38 3                      CLEVE Anthony, PT, DPT, OCS  7/13/2018

## 2018-07-17 ENCOUNTER — HOSPITAL ENCOUNTER (OUTPATIENT)
Dept: PHYSICAL THERAPY | Facility: HOSPITAL | Age: 63
Setting detail: THERAPIES SERIES
Discharge: HOME | End: 2018-07-17

## 2018-07-17 DIAGNOSIS — M54.2 NECK PAIN: Primary | ICD-10-CM

## 2018-07-17 DIAGNOSIS — R20.0 NUMBNESS IN BOTH HANDS: ICD-10-CM

## 2018-07-17 DIAGNOSIS — G56.03 BILATERAL CARPAL TUNNEL SYNDROME: ICD-10-CM

## 2018-07-17 PROCEDURE — 97140 MANUAL THERAPY 1/> REGIONS: CPT | Performed by: PHYSICAL THERAPIST

## 2018-07-17 NOTE — THERAPY TREATMENT NOTE
Outpatient Physical Therapy Ortho Treatment Note  [facility]     Patient Name: Adam Ferris  : 1955  MRN: [id number]  Today's Date: 2018      Visit Date: 2018    Visit Dx:    ICD-10-CM ICD-9-CM   1. Neck pain M54.2 723.1   2. Numbness in both hands R20.0 782.0   3. Bilateral carpal tunnel syndrome G56.03 354.0       Patient Active Problem List   Diagnosis   • Neck pain   • Numbness in both hands   • BMI 32.0-32.9,adult   • Nonsmoker   • Hand weakness   • Bilateral carpal tunnel syndrome   • Encounter for screening for malignant neoplasm of colon        Past Medical History:   Diagnosis Date   • Diabetes mellitus (CMS/HCC)    • Disease of thyroid gland    • Hyperlipidemia         Past Surgical History:   Procedure Laterality Date   • MOLE REMOVAL     • VASECTOMY                               PT Assessment/Plan     Row Name 18 1530          PT Assessment    Assessment Comments His pain is still staying relatively low. His hands will still go numb with using his hands but the carpal mobs are effective in relieving this.  -TB        PT Plan    PT Plan Comments work on carpal mobs and continue to work to decompress his neck.  -TB       User Key  (r) = Recorded By, (t) = Taken By, (c) = Cosigned By    Initials Name Provider Type    MARGUERITE Wick PT Physical Therapist                    Exercises     Row Name 18 1535 18 1530          Subjective Comments    Subjective Comments He feels like his hands are doing   -TB  --        Subjective Pain    Pre-Treatment Pain Level 1  -TB  --        Total Minutes    05004 - PT Manual Therapy Minutes  -- 45  -TB       User Key  (r) = Recorded By, (t) = Taken By, (c) = Cosigned By    Initials Name Provider Type    MARGUERITE Wick, PT Physical Therapist                        Manual Rx (last 36 hours)      Manual Treatments     Row Name 18 0330             Manual Rx 2    Manual Rx 2 Location prone CT  -TB      Manual Rx 2 Type ext  mob  -TB      Manual Rx 2 Grade 3 sustained  -TB      Manual Rx 2 Duration 25  -TB         Manual Rx 3    Manual Rx 3 Location supine manual cx traction  -TB      Manual Rx 3 Type extension mobilizations  -TB      Manual Rx 3 Grade 3 sustained   -TB      Manual Rx 3 Duration 10  -TB         Manual Rx 4    Manual Rx 4 Location --  -TB      Manual Rx 4 Type --  -TB      Manual Rx 4 Grade --  -TB      Manual Rx 4 Duration --  -TB         Manual Rx 5    Manual Rx 5 Location kinga carpal mobs  -TB      Manual Rx 5 Grade repetitive gr 3 radial and ulnar  -TB      Manual Rx 5 Duration 5 min each done simultaneously  -TB        User Key  (r) = Recorded By, (t) = Taken By, (c) = Cosigned By    Initials Name Provider Type    MARGUERITE Wick, PT Physical Therapist                PT OP Goals     Row Name 07/17/18 1530          Long Term Goals    LTG Date to Achieve 07/27/18  -TB     LTG 1 No numbness in his hands for a week  -TB     LTG 1 Progress Ongoing  -TB     LTG 1 Progress Comments still intermittent hand numbness daily  -TB     LTG 2 Improve neck extension to 50%  -TB     LTG 2 Progress Ongoing  -TB     LTG 3 Improve neck pain to <25% of the day avg of 1-2/10 when it occurs  -TB     LTG 3 Progress Ongoing  -TB     LTG 4 Able to type and work without exacerbation of hand numbness  -TB     LTG 4 Progress Ongoing  -TB     LTG 5 Ind with HEP for flexibility and ergonomics.   -TB     LTG 5 Progress Ongoing  -TB        Time Calculation    PT Goal Re-Cert Due Date 08/12/18  -TB       User Key  (r) = Recorded By, (t) = Taken By, (c) = Cosigned By    Initials Name Provider Type    MARGUERITE Wick, PT Physical Therapist          Therapy Education  Given: HEP, Symptoms/condition management, Posture/body mechanics  Program: Reinforced  How Provided: Verbal  Provided to: Patient  Level of Understanding: Verbalized              Time Calculation:   Start Time: 1530  Stop Time: 1615  Time Calculation (min): 45 min  Total Timed Code  Minutes- PT: 45 minute(s)  Therapy Suggested Charges     Code   Minutes Charges    48271 (CPT®) Hc Pt Neuromusc Re Education Ea 15 Min      20930 (CPT®) Hc Pt Ther Proc Ea 15 Min      84746 (CPT®) Hc Gait Training Ea 15 Min      49725 (CPT®) Hc Pt Therapeutic Act Ea 15 Min      94232 (CPT®) Hc Pt Manual Therapy Ea 15 Min 45 3    59703 (CPT®) Hc Pt Ther Massage- Per 15 Min      30000 (CPT®) Hc Pt Iontophoresis Ea 15 Min      32334 (CPT®) Hc Pt Elec Stim Ea-Per 15 Min      41541 (CPT®) Hc Pt Ultrasound Ea 15 Min      35568 (CPT®) Hc Pt Self Care/Mgmt/Train Ea 15 Min      Total  45 3                      Slick Wick, PT  7/17/2018

## 2018-07-20 ENCOUNTER — APPOINTMENT (OUTPATIENT)
Dept: PHYSICAL THERAPY | Facility: HOSPITAL | Age: 63
End: 2018-07-20

## 2018-07-24 ENCOUNTER — HOSPITAL ENCOUNTER (OUTPATIENT)
Dept: PHYSICAL THERAPY | Facility: HOSPITAL | Age: 63
Setting detail: THERAPIES SERIES
Discharge: HOME | End: 2018-07-24

## 2018-07-24 DIAGNOSIS — R20.0 NUMBNESS IN BOTH HANDS: ICD-10-CM

## 2018-07-24 DIAGNOSIS — G56.03 BILATERAL CARPAL TUNNEL SYNDROME: ICD-10-CM

## 2018-07-24 DIAGNOSIS — M54.2 NECK PAIN: Primary | ICD-10-CM

## 2018-07-24 PROCEDURE — 97110 THERAPEUTIC EXERCISES: CPT | Performed by: PHYSICAL THERAPIST

## 2018-07-24 PROCEDURE — 97140 MANUAL THERAPY 1/> REGIONS: CPT | Performed by: PHYSICAL THERAPIST

## 2018-07-24 NOTE — THERAPY TREATMENT NOTE
Outpatient Physical Therapy Ortho Treatment Note  [facility]     Patient Name: Adam Ferris  : 1955  MRN: [id number]  Today's Date: 2018      Visit Date: 2018    Visit Dx:    ICD-10-CM ICD-9-CM   1. Neck pain M54.2 723.1   2. Numbness in both hands R20.0 782.0   3. Bilateral carpal tunnel syndrome G56.03 354.0       Patient Active Problem List   Diagnosis   • Neck pain   • Numbness in both hands   • BMI 32.0-32.9,adult   • Nonsmoker   • Hand weakness   • Bilateral carpal tunnel syndrome   • Encounter for screening for malignant neoplasm of colon        Past Medical History:   Diagnosis Date   • Diabetes mellitus (CMS/HCC)    • Disease of thyroid gland    • Hyperlipidemia         Past Surgical History:   Procedure Laterality Date   • MOLE REMOVAL     • VASECTOMY                               PT Assessment/Plan     Row Name 18 1531          PT Assessment    Assessment Comments He is reporting no change in pain at this time. He does report minial hand symptoms, but did not do much computer work. He continues to have decreased cervicothoracic mobility.   -KR        PT Plan    PT Plan Comments Continue to work on decompression cervical spine and improving postural control.   -KR       User Key  (r) = Recorded By, (t) = Taken By, (c) = Cosigned By    Initials Name Provider Type    VICTORIA Nunn, PT DPT Physical Therapist                    Exercises     Row Name 18 4283             Subjective Comments    Subjective Comments He reports pain is mostly in his neck today. He reports no pain in hands currently. He reports minimal N/T in hands currently, but not using his hands as much today.   -KR         Subjective Pain    Able to rate subjective pain? yes  -KR      Pre-Treatment Pain Level --   1.5-2/10  -KR         Total Minutes    40390 - PT Therapeutic Exercise Minutes 15  -KR      99390 - PT Manual Therapy Minutes 35  -KR         Exercise 1    Exercise Name 1 supine horizontal  abduction with green Theraband  -KR      Cueing 1 Verbal;Tactile  -KR      Sets 1 3  -KR      Reps 1 10  -KR         Exercise 2    Exercise Name 2 (B)sidelying ER with 1# DB  -KR      Cueing 2 Verbal  -KR      Sets 2 2  -KR      Reps 2 20  -KR        User Key  (r) = Recorded By, (t) = Taken By, (c) = Cosigned By    Initials Name Provider Type    VICTORIA Nunn, PT DPT Physical Therapist                        Manual Rx (last 36 hours)      Manual Treatments     Row Name 07/24/18 1538             Total Minutes    24523 - PT Manual Therapy Minutes 35  -KR         Manual Rx 1    Manual Rx 1 Location prone thoracic  -KR      Manual Rx 1 Type   -KR      Manual Rx 1 Grade 2/3   no cavitations  -KR      Manual Rx 1 Duration 7  -KR         Manual Rx 2    Manual Rx 2 Location prone CT  -KR      Manual Rx 2 Type ext mob  -KR      Manual Rx 2 Grade 2/3  -KR      Manual Rx 2 Duration 8  -KR         Manual Rx 3    Manual Rx 3 Location supine manual cx traction  -KR      Manual Rx 3 Type extension mobilizations  -KR      Manual Rx 3 Grade 3 sustained   -KR      Manual Rx 3 Duration 10  -KR         Manual Rx 5    Manual Rx 5 Location kinga carpal mobs  -KR      Manual Rx 5 Grade repetitive gr 3 radial and ulnar  -KR      Manual Rx 5 Duration 5 min each done simultaneously  -KR        User Key  (r) = Recorded By, (t) = Taken By, (c) = Cosigned By    Initials Name Provider Type    VICTORIA Nunn, PT DPT Physical Therapist                PT OP Goals     Row Name 07/24/18 1538          Long Term Goals    LTG Date to Achieve 07/27/18  -KR     LTG 1 No numbness in his hands for a week  -KR     LTG 1 Progress Ongoing  -KR     LTG 1 Progress Comments minimal symptoms today, minimal computer work today.    -KR     LTG 2 Improve neck extension to 50%  -KR     LTG 2 Progress Ongoing  -KR     LTG 3 Improve neck pain to <25% of the day avg of 1-2/10 when it occurs  -KR     LTG 3 Progress Ongoing  -KR     LTG 4 Able to type and  work without exacerbation of hand numbness  -KR     LTG 4 Progress Ongoing  -KR     LTG 4 Progress Comments minimal computer work today  -KR     LTG 5 Ind with HEP for flexibility and ergonomics.   -KR     LTG 5 Progress Ongoing  -KR        Time Calculation    PT Goal Re-Cert Due Date 08/12/18  -KR       User Key  (r) = Recorded By, (t) = Taken By, (c) = Cosigned By    Initials Name Provider Type    VICTORIA Nunn, PT DPT Physical Therapist          Therapy Education  Given: HEP, Symptoms/condition management, Posture/body mechanics  Program: Reinforced  How Provided: Verbal  Provided to: Patient  Level of Understanding: Verbalized              Time Calculation:   Start Time: 1538  Stop Time: 1628  Time Calculation (min): 50 min  Total Timed Code Minutes- PT: 50 minute(s)  Therapy Suggested Charges     Code   Minutes Charges    68733 (CPT®) Hc Pt Neuromusc Re Education Ea 15 Min      09662 (CPT®) Hc Pt Ther Proc Ea 15 Min 15 1    16074 (CPT®) Hc Gait Training Ea 15 Min      79792 (CPT®) Hc Pt Therapeutic Act Ea 15 Min      43893 (CPT®) Hc Pt Manual Therapy Ea 15 Min 35 2    34257 (CPT®) Hc Pt Ther Massage- Per 15 Min      52889 (CPT®) Hc Pt Iontophoresis Ea 15 Min      16824 (CPT®) Hc Pt Elec Stim Ea-Per 15 Min      86820 (CPT®) Hc Pt Ultrasound Ea 15 Min      56389 (CPT®) Hc Pt Self Care/Mgmt/Train Ea 15 Min      Total  50 3        Therapy Charges for Today     Code Description Service Date Service Provider Modifiers Qty    77901609950 HC PT MANUAL THERAPY EA 15 MIN 7/24/2018 Renu Nunn, PT DPT GP 2    97900075156 HC PT THER PROC EA 15 MIN 7/24/2018 Renu Nunn, PT DPT GP 1                    Renu Nunn, PT DPT  7/24/2018

## 2018-07-27 ENCOUNTER — HOSPITAL ENCOUNTER (OUTPATIENT)
Dept: PHYSICAL THERAPY | Facility: HOSPITAL | Age: 63
Setting detail: THERAPIES SERIES
Discharge: HOME | End: 2018-07-27

## 2018-07-27 DIAGNOSIS — R20.0 NUMBNESS IN BOTH HANDS: ICD-10-CM

## 2018-07-27 DIAGNOSIS — G56.03 BILATERAL CARPAL TUNNEL SYNDROME: ICD-10-CM

## 2018-07-27 DIAGNOSIS — M54.2 NECK PAIN: Primary | ICD-10-CM

## 2018-07-27 PROCEDURE — 97110 THERAPEUTIC EXERCISES: CPT

## 2018-07-27 PROCEDURE — 97140 MANUAL THERAPY 1/> REGIONS: CPT

## 2018-07-27 NOTE — THERAPY TREATMENT NOTE
Outpatient Physical Therapy Ortho Treatment Note  [facility]     Patient Name: Adam Ferris  : 1955  MRN: [id number]  Today's Date: 2018      Visit Date: 2018    Visit Dx:    ICD-10-CM ICD-9-CM   1. Neck pain M54.2 723.1   2. Numbness in both hands R20.0 782.0   3. Bilateral carpal tunnel syndrome G56.03 354.0       Patient Active Problem List   Diagnosis   • Neck pain   • Numbness in both hands   • BMI 32.0-32.9,adult   • Nonsmoker   • Hand weakness   • Bilateral carpal tunnel syndrome   • Encounter for screening for malignant neoplasm of colon        Past Medical History:   Diagnosis Date   • Diabetes mellitus (CMS/HCC)    • Disease of thyroid gland    • Hyperlipidemia         Past Surgical History:   Procedure Laterality Date   • MOLE REMOVAL     • VASECTOMY                               PT Assessment/Plan     Row Name 18 1639          PT Assessment    Assessment Comments Today the Scratch collpse testing exhibited positive results at the B scalenes and pec regions only. His pain is minimal but the radicular symptoms while minnimal as well are constant.  -EC        PT Plan    PT Plan Comments Continue to work on posture control and overall spinal flexibility.  -EC       User Key  (r) = Recorded By, (t) = Taken By, (c) = Cosigned By    Initials Name Provider Type    EC Demetrio Castano PTA Physical Therapy Assistant                    Exercises     Row Name 18 1500             Subjective Comments    Subjective Comments He reports B hand N/T raates as 1/10  -EC         Subjective Pain    Able to rate subjective pain? yes  -EC      Pre-Treatment Pain Level 1  -EC         Total Minutes    42133 - PT Manual Therapy Minutes 25  -EC         Exercise 1    Exercise Name 1 Scratch Collapse B UE at all sites (see assessment section)  -EC         Exercise 2    Exercise Name 2 B UE unweighted towel roll along thoracic B cervical rotation, flexion/extension  -EC      Reps 2 20  -EC          Exercise 3    Exercise Name 3 progressive thoracic stretch on 1/2 foam roller  -EC      Time 3 4 min  -EC        User Key  (r) = Recorded By, (t) = Taken By, (c) = Cosigned By    Initials Name Provider Type    EC Demetrio Castano PTA Physical Therapy Assistant                        Manual Rx (last 36 hours)      Manual Treatments     Row Name 07/27/18 1500             Total Minutes    67497 - PT Manual Therapy Minutes 25  -EC         Manual Rx 1    Manual Rx 1 Duration 15  -EC         Manual Rx 2    Manual Rx 2 Location B upper traps and levator scapulae  -EC      Manual Rx 2 Type STM in sitting with B UE unweighted with towel roll along thoracic region  -EC      Manual Rx 2 Duration 10  -EC        User Key  (r) = Recorded By, (t) = Taken By, (c) = Cosigned By    Initials Name Provider Type    EC Demetrio Castano PTA Physical Therapy Assistant                PT OP Goals     Row Name 07/27/18 1542          Long Term Goals    LTG Date to Achieve 07/27/18  -EC     LTG 1 No numbness in his hands for a week  -EC     LTG 1 Progress Ongoing  -EC     LTG 1 Progress Comments reports constant  -EC     LTG 2 Improve neck extension to 50%  -EC     LTG 2 Progress Ongoing  -EC     LTG 3 Improve neck pain to <25% of the day avg of 1-2/10 when it occurs  -EC     LTG 3 Progress Ongoing  -EC     LTG 3 Progress Comments he reports his neck pain is  0.5/10 today   -EC     LTG 4 Able to type and work without exacerbation of hand numbness  -EC     LTG 4 Progress Ongoing  -EC     LTG 5 Ind with HEP for flexibility and ergonomics.   -EC     LTG 5 Progress Ongoing  -EC        Time Calculation    PT Goal Re-Cert Due Date 08/12/18  -EC       User Key  (r) = Recorded By, (t) = Taken By, (c) = Cosigned By    Initials Name Provider Type    MICHELLE Castano PTA Physical Therapy Assistant          Therapy Education  Education Details: reviewed HEP components chin tucks, dorway pec stretch, median nerve glides, added B UE unweighted with  towel roll along thoracic cervical AROM  Given: HEP  Program: New  How Provided: Verbal, Demonstration  Provided to: Patient  Level of Understanding: Verbalized, Demonstrated              Time Calculation:   Start Time: 1542  Stop Time: 1625  Time Calculation (min): 43 min  Total Timed Code Minutes- PT: 43 minute(s)  Therapy Suggested Charges     Code   Minutes Charges    77346 (CPT®) Hc Pt Neuromusc Re Education Ea 15 Min      16958 (CPT®) Hc Pt Ther Proc Ea 15 Min      77750 (CPT®) Hc Gait Training Ea 15 Min      20295 (CPT®) Hc Pt Therapeutic Act Ea 15 Min      18998 (CPT®) Hc Pt Manual Therapy Ea 15 Min 25 2    22467 (CPT®) Hc Pt Ther Massage- Per 15 Min      44308 (CPT®) Hc Pt Iontophoresis Ea 15 Min      93010 (CPT®) Hc Pt Elec Stim Ea-Per 15 Min      60047 (CPT®) Hc Pt Ultrasound Ea 15 Min      94341 (CPT®) Hc Pt Self Care/Mgmt/Train Ea 15 Min      Total  25 2        Therapy Charges for Today     Code Description Service Date Service Provider Modifiers Qty    62058019924 HC PT MANUAL THERAPY EA 15 MIN 7/27/2018 Demetrio Castano PTA GP 2    34128167247 HC PT THER PROC EA 15 MIN 7/27/2018 Demetrio Castano PTA GP 1                    Demetrio Castano PTA  7/27/2018

## 2018-08-01 ENCOUNTER — HOSPITAL ENCOUNTER (OUTPATIENT)
Dept: PHYSICAL THERAPY | Facility: HOSPITAL | Age: 63
Setting detail: THERAPIES SERIES
Discharge: HOME | End: 2018-08-01

## 2018-08-01 DIAGNOSIS — M54.2 NECK PAIN: Primary | ICD-10-CM

## 2018-08-01 DIAGNOSIS — R20.0 NUMBNESS IN BOTH HANDS: ICD-10-CM

## 2018-08-01 DIAGNOSIS — G56.03 BILATERAL CARPAL TUNNEL SYNDROME: ICD-10-CM

## 2018-08-01 PROCEDURE — 97535 SELF CARE MNGMENT TRAINING: CPT

## 2018-08-01 PROCEDURE — 97140 MANUAL THERAPY 1/> REGIONS: CPT

## 2018-08-01 NOTE — THERAPY TREATMENT NOTE
Outpatient Physical Therapy Ortho Treatment Note  [facility]     Patient Name: Adam Ferris  : 1955  MRN: [id number]  Today's Date: 2018      Visit Date: 2018    Visit Dx:    ICD-10-CM ICD-9-CM   1. Neck pain M54.2 723.1   2. Bilateral carpal tunnel syndrome G56.03 354.0   3. Numbness in both hands R20.0 782.0       Patient Active Problem List   Diagnosis   • Neck pain   • Numbness in both hands   • BMI 32.0-32.9,adult   • Nonsmoker   • Hand weakness   • Bilateral carpal tunnel syndrome   • Encounter for screening for malignant neoplasm of colon        Past Medical History:   Diagnosis Date   • Diabetes mellitus (CMS/HCC)    • Disease of thyroid gland    • Hyperlipidemia         Past Surgical History:   Procedure Laterality Date   • MOLE REMOVAL     • VASECTOMY              Hand Therapy (last 24 hours)      Hand Eval     Row Name 18 1545              Strength Right    # Reps (P)  3  -DC      Right Rung (P)  3  -DC      Right  Test 1 (P)  101  -DC      Right  Test 2 (P)  107  -DC      Right  Test 3 (P)  110  -DC       Strength Average Right (P)  106  -DC          Strength Left    # Reps (P)  3  -DC      Left Rung (P)  3  -DC      Left  Test 1 (P)  107  -DC      Left  Test 2 (P)  118  -DC      Left  Test 3 (P)  123  -DC       Strength Average Left (P)  116  -DC        User Key  (r) = Recorded By, (t) = Taken By, (c) = Cosigned By    Initials Name Provider Type    DC Colin Garcia PT Student                          PT Assessment/Plan     Row Name 18 1545          PT Assessment    Assessment Comments (P)  Pt was on his last visit for this referral. He expressed that he desires to discontinue PT for the time being until he decides whether or not to have carpal tunnel decompression bilaterally. His symptoms have largely dissipated and are now most intermittent. His  strength has increased approx. 5 PSI bilaterally compared to initial eval and  he reports improvement of 20% from original sxs. He was able to tolerate all manual therapy without decrease in symtpoms. He still demonstrates postural faults, but is independent with his home exercise program. PT and SPT feel he can safely continue performing all work and leisure activities while managing his own symptoms.  -DC        PT Plan    PT Plan Comments (P)  Pt expressed preference to discontinue therapy until further medical management decision or procedures have occurred.  -DC       User Key  (r) = Recorded By, (t) = Taken By, (c) = Cosigned By    Initials Name Provider Type    NIA Garcia PT Student                    Exercises     Row Name 08/01/18 1545             Subjective Comments    Subjective Comments (P)  Pt reports improved symptoms and feeling ready to have his last visit and return [facility] following further medical management.  -DC         Subjective Pain    Able to rate subjective pain? (P)  yes  -DC      Pre-Treatment Pain Level (P)  1  -DC      Subjective Pain Comment (P)  Hands have 0.5/10, and 1/10 in the  -DC         Total Minutes    21667 - PT Manual Therapy Minutes (P)  35  -DC        User Key  (r) = Recorded By, (t) = Taken By, (c) = Cosigned By    Initials Name Provider Type    NIA Garcia PT Student                        Manual Rx (last 36 hours)      Manual Treatments     Row Name 08/01/18 1545             Manual Rx 1    Manual Rx 1 Location (P)  Thoracic Spine  -DC      Manual Rx 1 Type (P)  Prone PAs  -DC      Manual Rx 1 Grade (P)  Sustained 3  -DC      Manual Rx 1 Duration (P)  15  -DC         Manual Rx 2    Manual Rx 2 Location (P)  CT  -DC      Manual Rx 2 Type (P)  Caudal glide for distraction  -DC      Manual Rx 2 Grade (P)  Sustained 3  -DC      Manual Rx 2 Duration (P)  5  -DC         Manual Rx 3    Manual Rx 3 Location (P)  Cervical Spine  -DC      Manual Rx 3 Type (P)  Suboccipital release  -DC      Manual Rx 3 Grade (P)  Sustained 3  -DC       Manual Rx 3 Duration (P)  5  -DC         Manual Rx 4    Manual Rx 4 Location (P)  Bilateral Carpal Tunnel  -DC      Manual Rx 4 Type (P)  Distraction mobilizations  -DC      Manual Rx 4 Grade (P)  Repetitive grade 3 radial and ulnar done by PT and SPT simultaneously  -DC      Manual Rx 4 Duration (P)  5  -DC        User Key  (r) = Recorded By, (t) = Taken By, (c) = Cosigned By    Initials Name Provider Type    DC Colin Garcia PT Student                PT OP Goals     Row Name 08/01/18 1545          Long Term Goals    LTG Date to Achieve (P)  07/27/18  -DC     LTG 1 (P)  No numbness in his hands for a week  -DC     LTG 1 Progress (P)  Ongoing  -DC     LTG 1 Progress Comments (P)  Pt continues to have intermittent numbness in hands  -DC     LTG 2 (P)  Improve neck extension to 50%  -DC     LTG 2 Progress (P)  Ongoing  -DC     LTG 2 Progress Comments (P)  Pt demonstrated neck extension with tucked chin, but has not yet achieved 50%  -DC     LTG 3 (P)  Improve neck pain to <25% of the day avg of 1-2/10 when it occurs  -DC     LTG 3 Progress (P)  Met  -DC     LTG 3 Progress Comments (P)  Pt has improve sxs to 1-2/10 most of the time  -DC     LTG 4 (P)  Able to type and work without exacerbation of hand numbness  -DC     LTG 4 Progress (P)  Ongoing  -DC     LTG 4 Progress Comments (P)  Hand numbness still present during typing and working  -DC     LTG 5 (P)  Ind with HEP for flexibility and ergonomics.   -DC     LTG 5 Progress (P)  Ongoing  -DC     LTG 5 Progress Comments (P)  Pt verbally explained his level of independence with HEP including some demonstration. HEP was progressed to add chin tucks with cervical extension for thoracic extension self-mob  -DC        Time Calculation    PT Goal Re-Cert Due Date (P)  08/12/18  -DC       User Key  (r) = Recorded By, (t) = Taken By, (c) = Cosigned By    Initials Name Provider Type    NIA Garcia PT Student          Therapy Education  Education Details: (P) Pt  educated regarding incorporation of chin tuck with cervical extension for self-mobilization of thoracic spine into extension, management of symptoms going forward, and nature of carpal tunnel compression.  Given: (P) HEP, Symptoms/condition management, Posture/body mechanics  Program: (P) New  How Provided: (P) Verbal, Demonstration  Provided to: (P) Patient  Level of Understanding: (P) Verbalized, Demonstrated  69768 - PT Self Care/Mgmt Minutes: (P) 10              Time Calculation:   Start Time: (P) 1545  Stop Time: (P) 1630  Time Calculation (min): (P) 45 min  Therapy Suggested Charges     Code   Minutes Charges    28334 (CPT®) Hc Pt Neuromusc Re Education Ea 15 Min      72564 (CPT®) Hc Pt Ther Proc Ea 15 Min      18875 (CPT®) Hc Gait Training Ea 15 Min      54802 (CPT®) Hc Pt Therapeutic Act Ea 15 Min      06608 (CPT®) Hc Pt Manual Therapy Ea 15 Min 35 2    75376 (CPT®) Hc Pt Ther Massage- Per 15 Min      32761 (CPT®) Hc Pt Iontophoresis Ea 15 Min      64094 (CPT®) Hc Pt Elec Stim Ea-Per 15 Min      77821 (CPT®) Hc Pt Ultrasound Ea 15 Min      29160 (CPT®) Hc Pt Self Care/Mgmt/Train Ea 15 Min 10 1    Total  45 3        Therapy Charges for Today     Code Description Service Date Service Provider Modifiers Qty    43723939588 HC PT MANUAL THERAPY EA 15 MIN 8/1/2018 Colin Garcia GP 2    62944384950 HC PT SELF CARE/MGMT/TRAIN EA 15 MIN 8/1/2018 Colin Garcia GP 1                    Colin Garcia  8/1/2018

## 2018-08-03 ENCOUNTER — TELEPHONE (OUTPATIENT)
Dept: GASTROENTEROLOGY | Facility: CLINIC | Age: 63
End: 2018-08-03

## 2018-08-03 ENCOUNTER — ANESTHESIA EVENT (OUTPATIENT)
Dept: GASTROENTEROLOGY | Facility: HOSPITAL | Age: 63
End: 2018-08-03

## 2018-08-03 ENCOUNTER — ANESTHESIA (OUTPATIENT)
Dept: GASTROENTEROLOGY | Facility: HOSPITAL | Age: 63
End: 2018-08-03

## 2018-08-03 ENCOUNTER — HOSPITAL ENCOUNTER (OUTPATIENT)
Facility: HOSPITAL | Age: 63
Setting detail: HOSPITAL OUTPATIENT SURGERY
Discharge: HOME | End: 2018-08-03
Attending: INTERNAL MEDICINE | Admitting: INTERNAL MEDICINE

## 2018-08-03 VITALS
WEIGHT: 205 LBS | RESPIRATION RATE: 21 BRPM | OXYGEN SATURATION: 98 % | HEIGHT: 67 IN | TEMPERATURE: 97.7 F | DIASTOLIC BLOOD PRESSURE: 79 MMHG | SYSTOLIC BLOOD PRESSURE: 130 MMHG | BODY MASS INDEX: 32.18 KG/M2 | HEART RATE: 72 BPM

## 2018-08-03 DIAGNOSIS — Z12.11 ENCOUNTER FOR SCREENING FOR MALIGNANT NEOPLASM OF COLON: ICD-10-CM

## 2018-08-03 LAB — GLUCOSE BLDC GLUCOMTR-MCNC: 112 MG/DL (ref 70–130)

## 2018-08-03 PROCEDURE — 25010000002 PROPOFOL 10 MG/ML EMULSION: Performed by: NURSE ANESTHETIST, CERTIFIED REGISTERED

## 2018-08-03 PROCEDURE — 45385 COLONOSCOPY W/LESION REMOVAL: CPT | Performed by: INTERNAL MEDICINE

## 2018-08-03 PROCEDURE — 82962 GLUCOSE BLOOD TEST: CPT

## 2018-08-03 PROCEDURE — 88305 TISSUE EXAM BY PATHOLOGIST: CPT | Performed by: INTERNAL MEDICINE

## 2018-08-03 RX ORDER — SODIUM CHLORIDE 9 MG/ML
500 INJECTION, SOLUTION INTRAVENOUS CONTINUOUS PRN
Status: DISCONTINUED | OUTPATIENT
Start: 2018-08-03 | End: 2018-08-03 | Stop reason: HOSPADM

## 2018-08-03 RX ORDER — PROPOFOL 10 MG/ML
VIAL (ML) INTRAVENOUS AS NEEDED
Status: DISCONTINUED | OUTPATIENT
Start: 2018-08-03 | End: 2018-08-03 | Stop reason: SURG

## 2018-08-03 RX ORDER — SODIUM CHLORIDE 0.9 % (FLUSH) 0.9 %
3 SYRINGE (ML) INJECTION AS NEEDED
Status: DISCONTINUED | OUTPATIENT
Start: 2018-08-03 | End: 2018-08-03 | Stop reason: HOSPADM

## 2018-08-03 RX ADMIN — SODIUM CHLORIDE 500 ML: 9 INJECTION, SOLUTION INTRAVENOUS at 08:06

## 2018-08-03 RX ADMIN — PROPOFOL 230 MG: 10 INJECTION, EMULSION INTRAVENOUS at 09:46

## 2018-08-03 RX ADMIN — LIDOCAINE HYDROCHLORIDE 0.5 ML: 10 INJECTION, SOLUTION EPIDURAL; INFILTRATION; INTRACAUDAL; PERINEURAL at 08:06

## 2018-08-03 NOTE — H&P
[facility] Gastroenterology  Pre Procedure History & Physical    Chief Complaint:   Screening    Subjective     HPI:   screening    Past Medical History:   Past Medical History:   Diagnosis Date   • Diabetes mellitus (CMS/HCC)    • Disease of thyroid gland    • Hyperlipidemia        Past Surgical History:  Past Surgical History:   Procedure Laterality Date   • MOLE REMOVAL     • VASECTOMY         Family History:  Family History   Problem Relation Age of Onset   • Diabetes Mother    • Hypertension Mother    • Diabetes Father    • Colon polyps Father    • Diabetes Sister    • Diabetes Brother    • Hypertension Brother    • Colon polyps Brother    • Diabetes Brother    • Hypertension Brother    • Diabetes Brother    • Hypertension Brother    • Diabetes Sister    • Throat cancer Maternal Grandfather        Social History:   reports that he has never smoked. He has quit using smokeless tobacco. He reports that he drinks alcohol. He reports that he does not use drugs.    Medications:   Prior to Admission medications    Medication Sig Start Date End Date Taking? Authorizing Provider   ACAI LUCAS PO Take  by mouth.   Yes ProviderNewton MD   Ascorbic Acid (VITAMIN C PO) Take 1,000 mg by mouth Daily.   Yes ProviderNewton MD   Cholecalciferol (VITAMIN D3 PO) Take  by mouth.   Yes ProviderNewton MD   glipiZIDE (GLUCOTROL) 5 MG tablet Take 5 mg by mouth 2 (Two) Times a Day Before Meals.   Yes Emergency, Nurse Bebo RN   insulin aspart prot-insulin aspart (novoLOG 70/30) (70-30) 100 UNIT/ML injection Inject  under the skin 2 (Two) Times a Day With Meals. 60 units   Yes Emergency, Nurse Bebo RN   LEVOTHYROXINE SODIUM PO Take  by mouth Daily.   Yes Nurse Bebo Hull RN   lisinopril (PRINIVIL,ZESTRIL) 10 MG tablet Take 10 mg by mouth Daily.   Yes Kurtis, Nurse Bebo RN   metFORMIN (GLUCOPHAGE) 1000 MG tablet Take 1,000 mg by mouth 2 (Two) Times a Day With Meals.   Yes Nurse Bebo Hull RN  "  Multiple Vitamins-Minerals (MULTIVITAMIN WITH MINERALS) tablet tablet Take 1 tablet by mouth Daily.   Yes Provider, MD Newton   Omega-3 Fatty Acids (FISH OIL) 1200 MG capsule delayed-release Take  by mouth.   Yes Provider, MD Newton   pravastatin (PRAVACHOL) 10 MG tablet Take 10 mg by mouth Daily.   Yes Emergency, Nurse Epic, RN   aspirin  MG tablet Take 325 mg by mouth Daily.    Emergency, Nurse Bebo RN       Allergies:  Patient has no known allergies.    ROS:    General: Weight stable  Resp: No SOA  Cardiovascular: No CP    Objective     Blood pressure 153/79, pulse 79, temperature 97.7 °F (36.5 °C), temperature source Temporal Artery , resp. rate 20, height 170.2 cm (67\"), weight 93 kg (205 lb), SpO2 97 %.    Physical Exam   Constitutional: Pt is oriented to person, place, and in no distress.   HENT: Mouth/Throat: Oropharynx is clear.   Cardiovascular: Normal rate, regular rhythm.    Pulmonary/Chest: Effort normal. No respiratory distress. No  wheezes.   Abdominal: Soft. Non-distended.  Skin: Skin is warm and dry.   Psychiatric: Mood, memory, affect and judgment appear normal.     Assessment/Plan     Diagnosis:  Screening    Anticipated Surgical Procedure:  c-scope    The risks, benefits, and alternatives of this procedure have been discussed with the patient or the responsible party- the patient understands and agrees to proceed.        "

## 2018-08-03 NOTE — ANESTHESIA PREPROCEDURE EVALUATION
Anesthesia Evaluation     Patient summary reviewed   no history of anesthetic complications:  NPO Solid Status: > 8 hours             Airway   Mallampati: II  TM distance: >3 FB  Neck ROM: full  Dental      Pulmonary    (+) sleep apnea on CPAP,   Cardiovascular   Exercise tolerance: excellent (>7 METS)    (+) hypertension, hyperlipidemia,       Neuro/Psych- negative ROS  GI/Hepatic/Renal/Endo    (+) obesity,   diabetes mellitus type 2 using insulin, hypothyroidism,     Musculoskeletal     Abdominal    Substance History      OB/GYN          Other                        Anesthesia Plan    ASA 2     general     intravenous induction   Anesthetic plan and risks discussed with patient.

## 2018-08-03 NOTE — ANESTHESIA POSTPROCEDURE EVALUATION
"Patient: Adam Ferris    Procedure Summary     Date:  08/03/18 Room / Location:  [facility] ENDOSCOPY 4 / [facility] ENDOSCOPY    Anesthesia Start:  0937 Anesthesia Stop:  1001    Procedure:  COLONOSCOPY WITH ANESTHESIA (N/A ) Diagnosis:       Encounter for screening for malignant neoplasm of colon      (Encounter for screening for malignant neoplasm of colon [Z12.11])    Surgeon:  Hussain Sanz DO Provider:  Kee Glass CRNA    Anesthesia Type:  general ASA Status:  2          Anesthesia Type: general  Last vitals  BP   153/79 (08/03/18 0753)   Temp   97.7 °F (36.5 °C) (08/03/18 0753)   Pulse   79 (08/03/18 0753)   Resp   20 (08/03/18 0753)     SpO2   97 % (08/03/18 0753)     Post Anesthesia Care and Evaluation    Patient location during evaluation: PACU  Patient participation: complete - patient participated  Level of consciousness: awake and alert  Pain management: adequate  Airway patency: patent  Anesthetic complications: No anesthetic complications    Cardiovascular status: acceptable  Respiratory status: acceptable  Hydration status: acceptable    Comments: Blood pressure 153/79, pulse 79, temperature 97.7 °F (36.5 °C), temperature source Temporal Artery , resp. rate 20, height 170.2 cm (67\"), weight 93 kg (205 lb), SpO2 97 %.    Pt discharged from PACU based on tyshawn score >8      "

## 2018-08-07 LAB
CYTO UR: NORMAL
LAB AP CASE REPORT: NORMAL
PATH REPORT.FINAL DX SPEC: NORMAL
PATH REPORT.GROSS SPEC: NORMAL

## 2018-12-07 ENCOUNTER — DOCUMENTATION (OUTPATIENT)
Dept: PHYSICAL THERAPY | Facility: HOSPITAL | Age: 63
End: 2018-12-07

## 2018-12-07 DIAGNOSIS — G56.03 BILATERAL CARPAL TUNNEL SYNDROME: ICD-10-CM

## 2018-12-07 DIAGNOSIS — R20.0 NUMBNESS IN BOTH HANDS: ICD-10-CM

## 2018-12-07 DIAGNOSIS — M54.2 NECK PAIN: Primary | ICD-10-CM

## 2018-12-07 NOTE — THERAPY DISCHARGE NOTE
Outpatient Physical Therapy Discharge Summary         Patient Name: Adam Ferris  : 1955  MRN: [id number]    Today's Date: 2018    Visit Dx:    ICD-10-CM ICD-9-CM   1. Neck pain M54.2 723.1   2. Bilateral carpal tunnel syndrome G56.03 354.0   3. Numbness in both hands R20.0 782.0       PT OP Goals     Row Name 18 1500          Long Term Goals    LTG Date to Achieve  18  -TB     LTG 1  No numbness in his hands for a week  -TB     LTG 1 Progress  Not Met  -TB     LTG 1 Progress Comments  Pt continues to have intermittent numbness in hands  -TB     LTG 2  Improve neck extension to 50%  -TB     LTG 2 Progress  Partially Met  -TB     LTG 2 Progress Comments  Pt demonstrated neck extension with tucked chin, but has not yet achieved 50%  -TB     LTG 3  Improve neck pain to <25% of the day avg of 1-2/10 when it occurs  -TB     LTG 3 Progress  Met  -TB     LTG 3 Progress Comments  Pt has improve sxs to 1-2/10 most of the time  -TB     LTG 4  Able to type and work without exacerbation of hand numbness  -TB     LTG 4 Progress  Not Met  -TB     LTG 4 Progress Comments  Hand numbness still present during typing and working  -TB     LTG 5  Ind with HEP for flexibility and ergonomics.   -TB     LTG 5 Progress  Met  -TB     LTG 5 Progress Comments  Pt verbally explained his level of independence with HEP including some demonstration. HEP was progressed to add chin tucks with cervical extension for thoracic extension self-mob  -TB       User Key  (r) = Recorded By, (t) = Taken By, (c) = Cosigned By    Initials Name Provider Type    TB Slick Wick, PT Physical Therapist          OP PT Discharge Summary  Date of Discharge: 18  Reason for Discharge: Maximum functional potential achieved, Lack of progress  Outcomes Achieved: Patient able to partially acheive established goals  Discharge Destination: Home with home program  Discharge Instructions/Additional Comments: Our emphasis was on manual  traction and mobilizations to his CT area as well as carpal mobs to relieve in case of double crush symdrome. He was a little looser but his radicular symptoms still persistented. We decided not to pursue more visits for lack of progress.       Time Calculation:        Therapy Suggested Charges     Code   Minutes Charges    None                       Slick Wick, PT  12/7/2018

## 2021-03-08 ENCOUNTER — APPOINTMENT (OUTPATIENT)
Dept: VACCINE CLINIC | Facility: HOSPITAL | Age: 66
End: 2021-03-08

## 2021-04-05 ENCOUNTER — APPOINTMENT (OUTPATIENT)
Dept: VACCINE CLINIC | Facility: HOSPITAL | Age: 66
End: 2021-04-05

## 2021-08-03 ENCOUNTER — OFFICE VISIT (OUTPATIENT)
Dept: GASTROENTEROLOGY | Facility: CLINIC | Age: 66
End: 2021-08-03

## 2021-08-03 VITALS
WEIGHT: 208 LBS | BODY MASS INDEX: 32.65 KG/M2 | HEIGHT: 67 IN | TEMPERATURE: 98 F | OXYGEN SATURATION: 98 % | DIASTOLIC BLOOD PRESSURE: 74 MMHG | SYSTOLIC BLOOD PRESSURE: 140 MMHG | HEART RATE: 76 BPM

## 2021-08-03 DIAGNOSIS — K62.5 RECTAL BLEED: ICD-10-CM

## 2021-08-03 DIAGNOSIS — Z86.010 HISTORY OF COLON POLYPS: Primary | ICD-10-CM

## 2021-08-03 PROCEDURE — 99204 OFFICE O/P NEW MOD 45 MIN: CPT | Performed by: NURSE PRACTITIONER

## 2021-08-03 RX ORDER — B-COMPLEX WITH VITAMIN C
TABLET ORAL
COMMUNITY

## 2021-08-03 RX ORDER — VITAMIN E 268 MG
400 CAPSULE ORAL DAILY
COMMUNITY

## 2021-08-03 NOTE — H&P (VIEW-ONLY)
Chief Complaint   Patient presents with   • Colonoscopy     8- colon polyps 3 year recall       PCP: Praveen Mojica MD  REFER: No ref. provider found    Subjective     HPI    Adam Ferris is a 65 y.o. male who presents to office for preventative maintenance.  There is  a personal history of colon polyps.  There is not a history of colon cancer.  He does not have complaints of nausea/vomiting, change in bowels, weight loss, , no melena.  He has observed bright red blood on toilet paper.  This occurs at least once per week.  If stool is hard he notices more blood.  PCP prescribed suppositories, Adam Ferris has continued to see blood on toilet paper.   There is not a family history of colon cancer.  There is not a family history of colon polyps.  His last colonoscopy-2018 .  Bowels do move on regular basis.  He states bowels move multiple times per day    CScope (Dr Sanz) 2018 hyperplastic polyp      Past Medical History:   Diagnosis Date   • Diabetes mellitus (CMS/HCC)    • Disease of thyroid gland    • Hyperlipidemia      Past Surgical History:   Procedure Laterality Date   • COLONOSCOPY N/A 8/3/2018    Procedure: COLONOSCOPY WITH ANESTHESIA;  Surgeon: Hussain Sanz DO;  [facility] ENDOSCOPY;  Service: Gastroenterology   • MOLE REMOVAL     • VASECTOMY       Outpatient Medications Marked as Taking for the 8/3/21 encounter (Office Visit) with Raulito Bae APRN   Medication Sig Dispense Refill   • ACAI LUCAS PO Take  by mouth.     • Ascorbic Acid (VITAMIN C PO) Take 1,000 mg by mouth Daily.     • aspirin  MG tablet Take 325 mg by mouth Daily.     • Cholecalciferol (VITAMIN D3 PO) Take  by mouth.     • glipiZIDE (GLUCOTROL) 5 MG tablet Take 5 mg by mouth 2 (Two) Times a Day Before Meals.     • insulin aspart prot-insulin aspart (novoLOG 70/30) (70-30) 100 UNIT/ML injection Inject  under the skin 2 (Two) Times a Day With Meals. 60 units     • LEVOTHYROXINE SODIUM PO Take  by mouth  Daily.     • lisinopril (PRINIVIL,ZESTRIL) 10 MG tablet Take 10 mg by mouth Daily.     • metFORMIN (GLUCOPHAGE) 1000 MG tablet Take 1,000 mg by mouth 2 (Two) Times a Day With Meals.     • Multiple Vitamins-Minerals (MULTIVITAMIN WITH MINERALS) tablet tablet Take 1 tablet by mouth Daily.     • Omega-3 Fatty Acids (FISH OIL) 1200 MG capsule delayed-release Take  by mouth.     • pravastatin (PRAVACHOL) 10 MG tablet Take 10 mg by mouth Daily.     • vitamin E 400 UNIT capsule Take 400 Units by mouth Daily.     • Zinc 100 MG tablet Take  by mouth.       No Known Allergies  Social History     Socioeconomic History   • Marital status:      Spouse name: Not on file   • Number of children: Not on file   • Years of education: Not on file   • Highest education level: Not on file   Tobacco Use   • Smoking status: Former Smoker   • Smokeless tobacco: Former User   Substance and Sexual Activity   • Alcohol use: Yes     Comment: occ   • Drug use: No   • Sexual activity: Defer     Review of Systems   Constitutional: Negative for unexpected weight change.   Respiratory: Negative for shortness of breath.    Cardiovascular: Negative for chest pain.   Gastrointestinal: Negative for abdominal pain and anal bleeding.     Objective   Vitals:    08/03/21 1353   BP: 140/74   Pulse: 76   Temp: 98 °F (36.7 °C)   SpO2: 98%     Physical Exam  Constitutional:       Appearance: Normal appearance. He is well-developed.   Eyes:      General: No scleral icterus.  Cardiovascular:      Rate and Rhythm: Regular rhythm.      Heart sounds: Normal heart sounds. No murmur heard.     Pulmonary:      Effort: Pulmonary effort is normal. No accessory muscle usage.      Breath sounds: Normal breath sounds.   Abdominal:      General: Bowel sounds are normal. There is no distension.      Palpations: Abdomen is soft. There is no mass.      Tenderness: There is no abdominal tenderness. There is no guarding or rebound.   Skin:     General: Skin is warm and  dry.      Coloration: Skin is not jaundiced.   Neurological:      Mental Status: He is alert.   Psychiatric:         Behavior: Behavior is cooperative.       Imaging Results (Most Recent)     None        Body mass index is 32.58 kg/m².    Assessment/Plan   Diagnoses and all orders for this visit:    1. History of colon polyps (Primary)    2. Rectal bleed  -     Case Request; Standing  -     Implement Anesthesia Orders Day of Procedure; Standing  -     Obtain Informed Consent; Standing  -     Case Request    Other orders  -     polyethylene glycol (GoLYTELY) 236 g solution; Take 3,785 mL by mouth 1 (One) Time for 1 dose. Take as directed  Dispense: 3785 mL; Refill: 0      COLONOSCOPY WITH ANESTHESIA (N/A)    Patient is to continue all blood pressure and cardiac medications prior to procedure and has been advised to take medications morning of procedure   Pt verbalized understanding     Pt to hold diabetes medication/insulin day of procedure to prevent any risk of complications of hypoglycemia intraprocedure.  If taking insulin 1/2 the PM dose as well     Advised pt to stop use of NSAIDs, Fish Oil, and MV 5 days prior to procedure, per Dr Sanz protocol.  Tylenol based products are ok to take.  Pt verbalized understanding.     All risks, benefits, alternatives, and indications of colonoscopy procedure have been discussed with the patient. Risks to include perforation of the colon requiring possible surgery or colostomy, risk of bleeding from biopsies or removal of colon tissue, possibility of missing a colon polyp or cancer, or adverse drug reaction.  Benefits to include the diagnosis and management of disease of the colon and rectum. Alternatives to include barium enema, radiographic evaluation, lab testing or no intervention. He verbalizes understanding and agrees.         Raulito Bae, APRN  08/03/21        There are no Patient Instructions on file for this visit.

## 2021-08-03 NOTE — PROGRESS NOTES
Chief Complaint   Patient presents with   • Colonoscopy     8- colon polyps 3 year recall       PCP: Praveen Mojica MD  REFER: No ref. provider found    Subjective     HPI    Adam Ferris is a 65 y.o. male who presents to office for preventative maintenance.  There is  a personal history of colon polyps.  There is not a history of colon cancer.  He does not have complaints of nausea/vomiting, change in bowels, weight loss, , no melena.  He has observed bright red blood on toilet paper.  This occurs at least once per week.  If stool is hard he notices more blood.  PCP prescribed suppositories, Adam Ferris has continued to see blood on toilet paper.   There is not a family history of colon cancer.  There is not a family history of colon polyps.  His last colonoscopy-2018 .  Bowels do move on regular basis.  He states bowels move multiple times per day    CScope (Dr Sanz) 2018 hyperplastic polyp      Past Medical History:   Diagnosis Date   • Diabetes mellitus (CMS/HCC)    • Disease of thyroid gland    • Hyperlipidemia      Past Surgical History:   Procedure Laterality Date   • COLONOSCOPY N/A 8/3/2018    Procedure: COLONOSCOPY WITH ANESTHESIA;  Surgeon: Hussain Sanz DO;  Location: [facility] ENDOSCOPY;  Service: Gastroenterology   • MOLE REMOVAL     • VASECTOMY       Outpatient Medications Marked as Taking for the 8/3/21 encounter (Office Visit) with Raulito Bae APRN   Medication Sig Dispense Refill   • ACAI LUCAS PO Take  by mouth.     • Ascorbic Acid (VITAMIN C PO) Take 1,000 mg by mouth Daily.     • aspirin  MG tablet Take 325 mg by mouth Daily.     • Cholecalciferol (VITAMIN D3 PO) Take  by mouth.     • glipiZIDE (GLUCOTROL) 5 MG tablet Take 5 mg by mouth 2 (Two) Times a Day Before Meals.     • insulin aspart prot-insulin aspart (novoLOG 70/30) (70-30) 100 UNIT/ML injection Inject  under the skin 2 (Two) Times a Day With Meals. 60 units     • LEVOTHYROXINE SODIUM PO Take  by mouth  Daily.     • lisinopril (PRINIVIL,ZESTRIL) 10 MG tablet Take 10 mg by mouth Daily.     • metFORMIN (GLUCOPHAGE) 1000 MG tablet Take 1,000 mg by mouth 2 (Two) Times a Day With Meals.     • Multiple Vitamins-Minerals (MULTIVITAMIN WITH MINERALS) tablet tablet Take 1 tablet by mouth Daily.     • Omega-3 Fatty Acids (FISH OIL) 1200 MG capsule delayed-release Take  by mouth.     • pravastatin (PRAVACHOL) 10 MG tablet Take 10 mg by mouth Daily.     • vitamin E 400 UNIT capsule Take 400 Units by mouth Daily.     • Zinc 100 MG tablet Take  by mouth.       No Known Allergies  Social History     Socioeconomic History   • Marital status:      Spouse name: Not on file   • Number of children: Not on file   • Years of education: Not on file   • Highest education level: Not on file   Tobacco Use   • Smoking status: Former Smoker   • Smokeless tobacco: Former User   Substance and Sexual Activity   • Alcohol use: Yes     Comment: occ   • Drug use: No   • Sexual activity: Defer     Review of Systems   Constitutional: Negative for unexpected weight change.   Respiratory: Negative for shortness of breath.    Cardiovascular: Negative for chest pain.   Gastrointestinal: Negative for abdominal pain and anal bleeding.     Objective   Vitals:    08/03/21 1353   BP: 140/74   Pulse: 76   Temp: 98 °F (36.7 °C)   SpO2: 98%     Physical Exam  Constitutional:       Appearance: Normal appearance. He is well-developed.   Eyes:      General: No scleral icterus.  Cardiovascular:      Rate and Rhythm: Regular rhythm.      Heart sounds: Normal heart sounds. No murmur heard.     Pulmonary:      Effort: Pulmonary effort is normal. No accessory muscle usage.      Breath sounds: Normal breath sounds.   Abdominal:      General: Bowel sounds are normal. There is no distension.      Palpations: Abdomen is soft. There is no mass.      Tenderness: There is no abdominal tenderness. There is no guarding or rebound.   Skin:     General: Skin is warm and  dry.      Coloration: Skin is not jaundiced.   Neurological:      Mental Status: He is alert.   Psychiatric:         Behavior: Behavior is cooperative.       Imaging Results (Most Recent)     None        Body mass index is 32.58 kg/m².    Assessment/Plan   Diagnoses and all orders for this visit:    1. History of colon polyps (Primary)    2. Rectal bleed  -     Case Request; Standing  -     Implement Anesthesia Orders Day of Procedure; Standing  -     Obtain Informed Consent; Standing  -     Case Request    Other orders  -     polyethylene glycol (GoLYTELY) 236 g solution; Take 3,785 mL by mouth 1 (One) Time for 1 dose. Take as directed  Dispense: 3785 mL; Refill: 0      COLONOSCOPY WITH ANESTHESIA (N/A)    Patient is to continue all blood pressure and cardiac medications prior to procedure and has been advised to take medications morning of procedure   Pt verbalized understanding     Pt to hold diabetes medication/insulin day of procedure to prevent any risk of complications of hypoglycemia intraprocedure.  If taking insulin 1/2 the PM dose as well     Advised pt to stop use of NSAIDs, Fish Oil, and MV 5 days prior to procedure, per Dr Sanz protocol.  Tylenol based products are ok to take.  Pt verbalized understanding.     All risks, benefits, alternatives, and indications of colonoscopy procedure have been discussed with the patient. Risks to include perforation of the colon requiring possible surgery or colostomy, risk of bleeding from biopsies or removal of colon tissue, possibility of missing a colon polyp or cancer, or adverse drug reaction.  Benefits to include the diagnosis and management of disease of the colon and rectum. Alternatives to include barium enema, radiographic evaluation, lab testing or no intervention. He verbalizes understanding and agrees.         Raulito Bae, APRN  08/03/21        There are no Patient Instructions on file for this visit.

## 2021-08-17 ENCOUNTER — ANESTHESIA (OUTPATIENT)
Dept: GASTROENTEROLOGY | Facility: HOSPITAL | Age: 66
End: 2021-08-17

## 2021-08-17 ENCOUNTER — ANESTHESIA EVENT (OUTPATIENT)
Dept: GASTROENTEROLOGY | Facility: HOSPITAL | Age: 66
End: 2021-08-17

## 2021-08-17 ENCOUNTER — HOSPITAL ENCOUNTER (OUTPATIENT)
Facility: HOSPITAL | Age: 66
Setting detail: HOSPITAL OUTPATIENT SURGERY
Discharge: HOME | End: 2021-08-17
Attending: INTERNAL MEDICINE | Admitting: INTERNAL MEDICINE

## 2021-08-17 VITALS
HEIGHT: 67 IN | SYSTOLIC BLOOD PRESSURE: 114 MMHG | BODY MASS INDEX: 32.02 KG/M2 | HEART RATE: 75 BPM | OXYGEN SATURATION: 97 % | TEMPERATURE: 96.9 F | WEIGHT: 204 LBS | DIASTOLIC BLOOD PRESSURE: 67 MMHG | RESPIRATION RATE: 18 BRPM

## 2021-08-17 DIAGNOSIS — K62.5 RECTAL BLEED: ICD-10-CM

## 2021-08-17 LAB — GLUCOSE BLDC GLUCOMTR-MCNC: 125 MG/DL (ref 70–130)

## 2021-08-17 PROCEDURE — 82962 GLUCOSE BLOOD TEST: CPT

## 2021-08-17 PROCEDURE — 25010000002 PROPOFOL 10 MG/ML EMULSION: Performed by: NURSE ANESTHETIST, CERTIFIED REGISTERED

## 2021-08-17 PROCEDURE — 45385 COLONOSCOPY W/LESION REMOVAL: CPT | Performed by: INTERNAL MEDICINE

## 2021-08-17 RX ORDER — SODIUM CHLORIDE 0.9 % (FLUSH) 0.9 %
10 SYRINGE (ML) INJECTION AS NEEDED
Status: DISCONTINUED | OUTPATIENT
Start: 2021-08-17 | End: 2021-08-17 | Stop reason: HOSPADM

## 2021-08-17 RX ORDER — LIDOCAINE HYDROCHLORIDE 20 MG/ML
INJECTION, SOLUTION EPIDURAL; INFILTRATION; INTRACAUDAL; PERINEURAL AS NEEDED
Status: DISCONTINUED | OUTPATIENT
Start: 2021-08-17 | End: 2021-08-17 | Stop reason: SURG

## 2021-08-17 RX ORDER — PROPOFOL 10 MG/ML
VIAL (ML) INTRAVENOUS AS NEEDED
Status: DISCONTINUED | OUTPATIENT
Start: 2021-08-17 | End: 2021-08-17 | Stop reason: SURG

## 2021-08-17 RX ORDER — LIDOCAINE HYDROCHLORIDE 10 MG/ML
0.5 INJECTION, SOLUTION EPIDURAL; INFILTRATION; INTRACAUDAL; PERINEURAL ONCE AS NEEDED
Status: CANCELLED | OUTPATIENT
Start: 2021-08-17

## 2021-08-17 RX ORDER — SODIUM CHLORIDE 9 MG/ML
500 INJECTION, SOLUTION INTRAVENOUS CONTINUOUS PRN
Status: DISCONTINUED | OUTPATIENT
Start: 2021-08-17 | End: 2021-08-17 | Stop reason: HOSPADM

## 2021-08-17 RX ADMIN — PROPOFOL 100 MG: 10 INJECTION, EMULSION INTRAVENOUS at 08:13

## 2021-08-17 RX ADMIN — SODIUM CHLORIDE 500 ML: 9 INJECTION, SOLUTION INTRAVENOUS at 07:24

## 2021-08-17 RX ADMIN — PROPOFOL 50 MG: 10 INJECTION, EMULSION INTRAVENOUS at 08:25

## 2021-08-17 RX ADMIN — PROPOFOL 50 MG: 10 INJECTION, EMULSION INTRAVENOUS at 08:22

## 2021-08-17 RX ADMIN — PROPOFOL 50 MG: 10 INJECTION, EMULSION INTRAVENOUS at 08:16

## 2021-08-17 RX ADMIN — PROPOFOL 50 MG: 10 INJECTION, EMULSION INTRAVENOUS at 08:19

## 2021-08-17 RX ADMIN — LIDOCAINE HYDROCHLORIDE 100 MG: 20 INJECTION, SOLUTION EPIDURAL; INFILTRATION; INTRACAUDAL; PERINEURAL at 08:13

## 2021-08-17 NOTE — ANESTHESIA POSTPROCEDURE EVALUATION
Patient: Adam Ferris    Procedure Summary     Date: 08/17/21 Room / Location: [facility] ENDOSCOPY 5 / [facility]    Anesthesia Start: 0812 Anesthesia Stop: 0830    Procedure: COLONOSCOPY WITH ANESTHESIA (N/A ) Diagnosis:       Rectal bleed      (Rectal bleed [K62.5])    Surgeons: Hussain Sanz DO Provider: KATHY Munson CRNA    Anesthesia Type: MAC ASA Status: 3          Anesthesia Type: MAC    Vitals  No vitals data found for the desired time range.          Post Anesthesia Care and Evaluation    Patient location during evaluation: PACU  Patient participation: complete - patient participated  Level of consciousness: awake and alert  Pain score: 0  Pain management: adequate  Airway patency: patent  Anesthetic complications: No anesthetic complications    Cardiovascular status: acceptable and stable  Respiratory status: acceptable and unassisted  Hydration status: acceptable

## 2021-08-17 NOTE — ANESTHESIA PREPROCEDURE EVALUATION
Anesthesia Evaluation     Patient summary reviewed and Nursing notes reviewed   no history of anesthetic complications:  NPO Solid Status: > 8 hours  NPO Liquid Status: > 2 hours           Airway   Mallampati: III  TM distance: >3 FB  Neck ROM: full  Possible difficult intubation  Dental      Pulmonary    (+) sleep apnea on CPAP,   Cardiovascular   Exercise tolerance: good (4-7 METS)    (+) hypertension, hyperlipidemia,       Neuro/Psych  (-) seizures, TIA, CVA  GI/Hepatic/Renal/Endo    (+)   diabetes mellitus type 2 using insulin,   (-) liver disease, no renal disease    Musculoskeletal     Abdominal    Substance History      OB/GYN          Other                        Anesthesia Plan    ASA 3     MAC     intravenous induction     Anesthetic plan, all risks, benefits, and alternatives have been provided, discussed and informed consent has been obtained with: patient.

## 2021-08-20 ENCOUNTER — TELEPHONE (OUTPATIENT)
Dept: GASTROENTEROLOGY | Facility: CLINIC | Age: 66
End: 2021-08-20

## 2024-04-29 NOTE — PROGRESS NOTES
Chief Complaint  Elevated PSA    Subjective          Adam Ferris presents to [facility] UROLOGY   Elevated PSA  Patient presents today with a presumed abnormality of the prostate gland, that is an elevated PSA.  This is been found in the context of prostate cancer screening.  The severity is best defined as a PSA 6.3 ng/mL.  This test was done approximately 6 weeks ago.  Patient denies any suspected systemic symptoms of prostate cancer such as weight loss, lower extremity edema, or skeletal pain that could be worrisome for metastases.  Previous biopsy history, if applicable:          Current Outpatient Medications:     Ascorbic Acid (VITAMIN C PO), Take 1,000 mg by mouth Daily., Disp: , Rfl:     aspirin  MG tablet, Take 1 tablet by mouth Daily., Disp: , Rfl:     Hydrocort-Pramoxine, Perianal, (PROCTOFOAM-HS) 1-1 % rectal foam, Insert 1 Application into the rectum 2 (Two) Times a Day., Disp: , Rfl:     insulin aspart prot-insulin aspart (novoLOG 70/30) (70-30) 100 UNIT/ML injection, Inject  under the skin 2 (Two) Times a Day With Meals. 60 units, Disp: , Rfl:     Jardiance 25 MG tablet tablet, Take 1 tablet by mouth Daily., Disp: , Rfl:     LEVOTHYROXINE SODIUM PO, Take  by mouth Daily., Disp: , Rfl:     lisinopril (PRINIVIL,ZESTRIL) 10 MG tablet, Take 1 tablet by mouth Daily., Disp: , Rfl:     metFORMIN (GLUCOPHAGE) 1000 MG tablet, Take 1 tablet by mouth 2 (Two) Times a Day With Meals., Disp: , Rfl:     Multiple Vitamins-Minerals (MULTIVITAMIN WITH MINERALS) tablet tablet, Take 1 tablet by mouth Daily., Disp: , Rfl:     Omega-3 Fatty Acids (FISH OIL) 1200 MG capsule delayed-release, Take 1 capsule by mouth Daily., Disp: , Rfl:     pravastatin (PRAVACHOL) 40 MG tablet, Take 1 tablet by mouth Every Night., Disp: , Rfl:     tamsulosin (FLOMAX) 0.4 MG capsule 24 hr capsule, Take 1 capsule by mouth Daily., Disp: , Rfl:     Zinc 100 MG tablet, Take  by mouth Daily., Disp: , Rfl:     acyclovir  "(Zovirax) 5 % ointment, Apply 1 application  topically to the appropriate area as directed 4 (Four) Times a Day. (Patient not taking: Reported on 5/2/2024), Disp: 5 g, Rfl: 0    mupirocin (BACTROBAN) 2 % ointment, Apply 1 application  topically to the appropriate area as directed 3 (Three) Times a Day. (Patient not taking: Reported on 5/2/2024), Disp: , Rfl:   Past Medical History:   Diagnosis Date    Diabetes mellitus     Disease of thyroid gland     Hyperlipidemia     Sleep apnea     cpap at hs     Past Surgical History:   Procedure Laterality Date    COLONOSCOPY N/A 8/3/2018    Procedure: COLONOSCOPY WITH ANESTHESIA;  Surgeon: Hussain Sanz DO;  Location:  [facility] ENDOSCOPY;  Service: Gastroenterology    COLONOSCOPY N/A 8/17/2021    Procedure: COLONOSCOPY WITH ANESTHESIA;  Surgeon: Hussain Sanz DO;  Location:  [facility] ENDOSCOPY;  Service: Gastroenterology;  Laterality: N/A;  pre rectal bleed  post diverticulosis; polyps  Praveen Mojica MD    MOLE REMOVAL      VASECTOMY             Review of Systems      Objective   PHYSICAL EXAM  Vital Signs:   Ht 170.2 cm (67\")   Wt 89.8 kg (198 lb)   BMI 31.01 kg/m²     Physical Exam      DATA  Result Review :              Results for orders placed or performed in visit on 05/02/24   POC Urinalysis Dipstick, Multipro    Specimen: Urine   Result Value Ref Range    Color Yellow Yellow, Straw, Dark Yellow, Jacque    Clarity, UA Clear Clear    Glucose,  mg/dL (A) Negative mg/dL    Bilirubin Negative Negative    Ketones, UA Negative Negative    Specific Gravity  1.005 1.005 - 1.030    Blood, UA Negative Negative    pH, Urine 6.5 5.0 - 8.0    Protein, POC Negative Negative mg/dL    Urobilinogen, UA 0.2 E.U./dL Normal, 0.2 E.U./dL    Nitrite, UA Negative Negative    Leukocytes Negative Negative            ASSESSMENT AND PLAN          Problem List Items Addressed This Visit    None  Visit Diagnoses       Elevated prostate specific antigen (PSA)    -  Primary    Relevant " Orders    POC Urinalysis Dipstick, Multipro (Completed)        This patient has elevated PSA with no prior biopsy . This represents an undiagnosed new problem with uncertain prognosis until further evaluated.  It is recommended that he have a multi parametric magnetic resonance imaging study of the pelvis with and without gadolinium contrast using specific prostate protocol.  It is explained that abnormalities can be seen on a 3 Leticia magnet based upon water content and increased contrast enhancement suggesting neovascularity.  Such images can then be used with the [employer] system to allow fusion of the images with live transrectal ultrasound images to increase the accuracy of biopsy.  Another advantage of this system is that it does appear to be more likely to show high risk prostate cancer lesions which would be very important to identify since these are likely to be more aggressive.  The risks of prostate ultrasound and biopsy including infection with or without sepsis, hematuria, amount aspermia, and rectal bleeding are also discussed.  Transient erectile dysfunction is also included in the discussion.  The patient does wish to proceed.       Data Reviewed, discussed as option and/or ordered during today's visit to evaluate and manage:   DATA ORDERED/REVIEWED THIS VISIT: PSA, URINALYSIS, and MULTIPARAMETRIC MRI TO ASSESS POSSIBILITY OF PROSTATE CANCER ORITS STAGE IF PRESENT        FOLLOW UP     Return in about 3 weeks (around 5/23/2024) for muliparametric MRI pelvis (Prostate MRI).        (Please note that portions of this note were completed with a voice recognition program.)  Phillip Casiano MD  05/02/24  08:53 CDT

## 2024-05-02 ENCOUNTER — OFFICE VISIT (OUTPATIENT)
Dept: UROLOGY | Facility: CLINIC | Age: 69
End: 2024-05-02
Payer: OTHER GOVERNMENT

## 2024-05-02 ENCOUNTER — PATIENT ROUNDING (BHMG ONLY) (OUTPATIENT)
Dept: UROLOGY | Facility: CLINIC | Age: 69
End: 2024-05-02
Payer: MEDICARE

## 2024-05-02 VITALS — WEIGHT: 198 LBS | BODY MASS INDEX: 31.08 KG/M2 | HEIGHT: 67 IN

## 2024-05-02 DIAGNOSIS — R97.20 ELEVATED PROSTATE SPECIFIC ANTIGEN (PSA): Primary | ICD-10-CM

## 2024-05-02 LAB
BILIRUB BLD-MCNC: NEGATIVE MG/DL
CLARITY, POC: CLEAR
COLOR UR: YELLOW
GLUCOSE UR STRIP-MCNC: ABNORMAL MG/DL
KETONES UR QL: NEGATIVE
LEUKOCYTE EST, POC: NEGATIVE
NITRITE UR-MCNC: NEGATIVE MG/ML
PH UR: 6.5 [PH] (ref 5–8)
PROT UR STRIP-MCNC: NEGATIVE MG/DL
RBC # UR STRIP: NEGATIVE /UL
SP GR UR: 1 (ref 1–1.03)
UROBILINOGEN UR QL: ABNORMAL

## 2024-05-02 PROCEDURE — 81003 URINALYSIS AUTO W/O SCOPE: CPT | Performed by: UROLOGY

## 2024-05-02 PROCEDURE — 99204 OFFICE O/P NEW MOD 45 MIN: CPT | Performed by: UROLOGY

## 2024-10-10 ENCOUNTER — TRANSCRIBE ORDERS (OUTPATIENT)
Dept: ADMINISTRATIVE | Facility: HOSPITAL | Age: 69
End: 2024-10-10
Payer: MEDICARE

## 2024-10-10 DIAGNOSIS — R97.20 ELEVATED PSA: Primary | ICD-10-CM

## 2024-10-15 ENCOUNTER — TRANSCRIBE ORDERS (OUTPATIENT)
Dept: ADMINISTRATIVE | Facility: HOSPITAL | Age: 69
End: 2024-10-15
Payer: MEDICARE

## 2024-10-15 DIAGNOSIS — R97.20 ELEVATED PROSTATE SPECIFIC ANTIGEN (PSA): Primary | ICD-10-CM

## 2025-01-13 ENCOUNTER — HOSPITAL ENCOUNTER (OUTPATIENT)
Dept: MRI IMAGING | Facility: HOSPITAL | Age: 70
Discharge: HOME | End: 2025-01-13
Admitting: PHYSICIAN ASSISTANT
Payer: OTHER GOVERNMENT

## 2025-01-13 DIAGNOSIS — R97.20 ELEVATED PROSTATE SPECIFIC ANTIGEN (PSA): ICD-10-CM

## 2025-01-13 LAB — CREAT BLDA-MCNC: 1 MG/DL (ref 0.6–1.3)

## 2025-01-13 PROCEDURE — 82565 ASSAY OF CREATININE: CPT

## 2025-01-13 PROCEDURE — A9579 GAD-BASE MR CONTRAST NOS,1ML: HCPCS | Performed by: PHYSICIAN ASSISTANT

## 2025-01-13 PROCEDURE — 72197 MRI PELVIS W/O & W/DYE: CPT

## 2025-01-13 PROCEDURE — 25510000001 GADOPICLENOL 0.5 MMOL/ML SOLUTION: Performed by: PHYSICIAN ASSISTANT

## 2025-01-13 RX ADMIN — GADOPICLENOL 10 ML: 485.1 INJECTION INTRAVENOUS at 10:52

## (undated) DEVICE — YANKAUER,BULB TIP WITH VENT: Brand: ARGYLE

## (undated) DEVICE — TBG SMPL FLTR LINE NASL 02/C02 A/ BX/100

## (undated) DEVICE — SENSR O2 OXIMAX FNGR A/ 18IN NONSTR

## (undated) DEVICE — Device: Brand: DEFENDO AIR/WATER/SUCTION AND BIOPSY VALVE

## (undated) DEVICE — THE SINGLE USE ETRAP – POLYP TRAP IS USED FOR SUCTION RETRIEVAL OF ENDOSCOPICALLY REMOVED POLYPS.: Brand: ETRAP

## (undated) DEVICE — MASK,OXYGEN,MED CONC,ADLT,7' TUB, UC: Brand: PENDING

## (undated) DEVICE — SNAR POLYP CAPTIVATOR MICROHEX 13 240CM

## (undated) DEVICE — ENDOGATOR AUXILIARY WATER JET CONNECTOR: Brand: ENDOGATOR

## (undated) DEVICE — THE CHANNEL CLEANING BRUSH IS A NYLON FLEXI BRUSH ATTACHED TO A FLEXIBLE PLASTIC SHEATH DESIGNED TO SAFELY REMOVE DEBRIS FROM FLEXIBLE ENDOSCOPES.